# Patient Record
Sex: FEMALE | Race: WHITE | NOT HISPANIC OR LATINO
[De-identification: names, ages, dates, MRNs, and addresses within clinical notes are randomized per-mention and may not be internally consistent; named-entity substitution may affect disease eponyms.]

---

## 2017-03-02 ENCOUNTER — NON-APPOINTMENT (OUTPATIENT)
Age: 82
End: 2017-03-02

## 2017-03-02 ENCOUNTER — APPOINTMENT (OUTPATIENT)
Dept: CARDIOLOGY | Facility: CLINIC | Age: 82
End: 2017-03-02

## 2017-03-02 VITALS
DIASTOLIC BLOOD PRESSURE: 70 MMHG | SYSTOLIC BLOOD PRESSURE: 130 MMHG | BODY MASS INDEX: 22.78 KG/M2 | OXYGEN SATURATION: 98 % | HEIGHT: 60 IN | WEIGHT: 116 LBS | HEART RATE: 58 BPM

## 2017-03-09 ENCOUNTER — APPOINTMENT (OUTPATIENT)
Dept: ELECTROPHYSIOLOGY | Facility: CLINIC | Age: 82
End: 2017-03-09

## 2017-06-07 ENCOUNTER — APPOINTMENT (OUTPATIENT)
Dept: CARDIOLOGY | Facility: CLINIC | Age: 82
End: 2017-06-07

## 2017-06-07 ENCOUNTER — APPOINTMENT (OUTPATIENT)
Dept: ELECTROPHYSIOLOGY | Facility: CLINIC | Age: 82
End: 2017-06-07

## 2017-08-01 ENCOUNTER — RX RENEWAL (OUTPATIENT)
Age: 82
End: 2017-08-01

## 2017-08-29 ENCOUNTER — RX RENEWAL (OUTPATIENT)
Age: 82
End: 2017-08-29

## 2017-09-06 ENCOUNTER — NON-APPOINTMENT (OUTPATIENT)
Age: 82
End: 2017-09-06

## 2017-09-06 ENCOUNTER — APPOINTMENT (OUTPATIENT)
Dept: CARDIOLOGY | Facility: CLINIC | Age: 82
End: 2017-09-06
Payer: MEDICARE

## 2017-09-06 VITALS
DIASTOLIC BLOOD PRESSURE: 72 MMHG | HEIGHT: 62 IN | SYSTOLIC BLOOD PRESSURE: 128 MMHG | HEART RATE: 56 BPM | WEIGHT: 111 LBS | BODY MASS INDEX: 20.43 KG/M2 | OXYGEN SATURATION: 97 %

## 2017-09-06 DIAGNOSIS — I34.0 NONRHEUMATIC MITRAL (VALVE) INSUFFICIENCY: ICD-10-CM

## 2017-09-06 PROCEDURE — 99215 OFFICE O/P EST HI 40 MIN: CPT

## 2017-09-06 PROCEDURE — 93000 ELECTROCARDIOGRAM COMPLETE: CPT

## 2017-09-13 LAB
25(OH)D3 SERPL-MCNC: 53.9 NG/ML
ALBUMIN SERPL ELPH-MCNC: 3.4 G/DL
ALP BLD-CCNC: 101 U/L
ALT SERPL-CCNC: 9 U/L
ANION GAP SERPL CALC-SCNC: 17 MMOL/L
AST SERPL-CCNC: 14 U/L
BASOPHILS # BLD AUTO: 0.05 K/UL
BASOPHILS NFR BLD AUTO: 0.5 %
BILIRUB SERPL-MCNC: 0.3 MG/DL
BUN SERPL-MCNC: 19 MG/DL
CALCIUM SERPL-MCNC: 9.1 MG/DL
CHLORIDE SERPL-SCNC: 100 MMOL/L
CHOLEST SERPL-MCNC: 165 MG/DL
CHOLEST/HDLC SERPL: 2.7 RATIO
CO2 SERPL-SCNC: 26 MMOL/L
CREAT SERPL-MCNC: 0.95 MG/DL
CRP SERPL HS-MCNC: 14.5 MG/L
DIGOXIN SERPL-MCNC: 1.4 NG/ML
EOSINOPHIL # BLD AUTO: 0.1 K/UL
EOSINOPHIL NFR BLD AUTO: 1 %
GLUCOSE SERPL-MCNC: 185 MG/DL
HBA1C MFR BLD HPLC: 5.3 %
HCT VFR BLD CALC: 40 %
HDLC SERPL-MCNC: 62 MG/DL
HGB BLD-MCNC: 12.8 G/DL
IMM GRANULOCYTES NFR BLD AUTO: 0.2 %
LDLC SERPL CALC-MCNC: 82 MG/DL
LYMPHOCYTES # BLD AUTO: 1.7 K/UL
LYMPHOCYTES NFR BLD AUTO: 17.5 %
MAGNESIUM SERPL-MCNC: 1.8 MG/DL
MAN DIFF?: NORMAL
MCHC RBC-ENTMCNC: 30.1 PG
MCHC RBC-ENTMCNC: 32 GM/DL
MCV RBC AUTO: 94.1 FL
MONOCYTES # BLD AUTO: 0.73 K/UL
MONOCYTES NFR BLD AUTO: 7.5 %
NEUTROPHILS # BLD AUTO: 7.14 K/UL
NEUTROPHILS NFR BLD AUTO: 73.3 %
PLATELET # BLD AUTO: 247 K/UL
POTASSIUM SERPL-SCNC: 3.5 MMOL/L
PROT SERPL-MCNC: 6.5 G/DL
RBC # BLD: 4.25 M/UL
RBC # FLD: 14.1 %
SODIUM SERPL-SCNC: 143 MMOL/L
T3FREE SERPL-MCNC: 1.91 PG/ML
T4 FREE SERPL-MCNC: 1.2 NG/DL
TRIGL SERPL-MCNC: 107 MG/DL
TSH SERPL-ACNC: 3.19 UIU/ML
URATE SERPL-MCNC: 5.4 MG/DL
WBC # FLD AUTO: 9.74 K/UL

## 2017-12-07 ENCOUNTER — NON-APPOINTMENT (OUTPATIENT)
Age: 82
End: 2017-12-07

## 2017-12-07 ENCOUNTER — APPOINTMENT (OUTPATIENT)
Dept: CARDIOLOGY | Facility: CLINIC | Age: 82
End: 2017-12-07
Payer: MEDICARE

## 2017-12-07 VITALS
WEIGHT: 109 LBS | HEART RATE: 72 BPM | SYSTOLIC BLOOD PRESSURE: 100 MMHG | OXYGEN SATURATION: 98 % | HEIGHT: 62 IN | BODY MASS INDEX: 20.06 KG/M2 | DIASTOLIC BLOOD PRESSURE: 70 MMHG

## 2017-12-07 DIAGNOSIS — R00.1 BRADYCARDIA, UNSPECIFIED: ICD-10-CM

## 2017-12-07 PROCEDURE — 93280 PM DEVICE PROGR EVAL DUAL: CPT

## 2017-12-07 PROCEDURE — 93000 ELECTROCARDIOGRAM COMPLETE: CPT

## 2017-12-07 PROCEDURE — 99215 OFFICE O/P EST HI 40 MIN: CPT

## 2018-04-19 ENCOUNTER — RX RENEWAL (OUTPATIENT)
Age: 83
End: 2018-04-19

## 2018-05-18 ENCOUNTER — RX RENEWAL (OUTPATIENT)
Age: 83
End: 2018-05-18

## 2018-06-07 ENCOUNTER — NON-APPOINTMENT (OUTPATIENT)
Age: 83
End: 2018-06-07

## 2018-06-07 ENCOUNTER — APPOINTMENT (OUTPATIENT)
Dept: CARDIOLOGY | Facility: CLINIC | Age: 83
End: 2018-06-07
Payer: MEDICARE

## 2018-06-07 VITALS
SYSTOLIC BLOOD PRESSURE: 118 MMHG | OXYGEN SATURATION: 99 % | BODY MASS INDEX: 20.61 KG/M2 | DIASTOLIC BLOOD PRESSURE: 70 MMHG | HEIGHT: 62 IN | WEIGHT: 112 LBS | HEART RATE: 60 BPM

## 2018-06-07 PROCEDURE — 99215 OFFICE O/P EST HI 40 MIN: CPT

## 2018-06-07 PROCEDURE — 93000 ELECTROCARDIOGRAM COMPLETE: CPT

## 2018-06-08 LAB
25(OH)D3 SERPL-MCNC: 55.8 NG/ML
ALBUMIN SERPL ELPH-MCNC: 3.5 G/DL
ALP BLD-CCNC: 83 U/L
ALT SERPL-CCNC: 9 U/L
ANION GAP SERPL CALC-SCNC: 17 MMOL/L
AST SERPL-CCNC: 16 U/L
BASOPHILS # BLD AUTO: 0.05 K/UL
BASOPHILS NFR BLD AUTO: 0.5 %
BILIRUB SERPL-MCNC: 0.4 MG/DL
BUN SERPL-MCNC: 19 MG/DL
CALCIUM SERPL-MCNC: 9.2 MG/DL
CHLORIDE SERPL-SCNC: 102 MMOL/L
CHOLEST SERPL-MCNC: 179 MG/DL
CHOLEST/HDLC SERPL: 2.6 RATIO
CO2 SERPL-SCNC: 24 MMOL/L
CREAT SERPL-MCNC: 0.86 MG/DL
CRP SERPL HS-MCNC: 7.4 MG/L
DIGOXIN SERPL-MCNC: 1 NG/ML
EOSINOPHIL # BLD AUTO: 0.1 K/UL
EOSINOPHIL NFR BLD AUTO: 0.9 %
GLUCOSE SERPL-MCNC: 99 MG/DL
HBA1C MFR BLD HPLC: 5.1 %
HCT VFR BLD CALC: 40.9 %
HDLC SERPL-MCNC: 69 MG/DL
HGB BLD-MCNC: 12.8 G/DL
IMM GRANULOCYTES NFR BLD AUTO: 0.3 %
LDLC SERPL CALC-MCNC: 90 MG/DL
LYMPHOCYTES # BLD AUTO: 1.53 K/UL
LYMPHOCYTES NFR BLD AUTO: 14.1 %
MAGNESIUM SERPL-MCNC: 1.8 MG/DL
MAN DIFF?: NORMAL
MCHC RBC-ENTMCNC: 29.1 PG
MCHC RBC-ENTMCNC: 31.3 GM/DL
MCV RBC AUTO: 93 FL
MONOCYTES # BLD AUTO: 0.91 K/UL
MONOCYTES NFR BLD AUTO: 8.4 %
NEUTROPHILS # BLD AUTO: 8.23 K/UL
NEUTROPHILS NFR BLD AUTO: 75.8 %
PLATELET # BLD AUTO: 252 K/UL
POTASSIUM SERPL-SCNC: 3.9 MMOL/L
PROT SERPL-MCNC: 6.4 G/DL
RBC # BLD: 4.4 M/UL
RBC # FLD: 14.1 %
SODIUM SERPL-SCNC: 143 MMOL/L
T3FREE SERPL-MCNC: 1.99 PG/ML
T4 FREE SERPL-MCNC: 1.2 NG/DL
TRIGL SERPL-MCNC: 99 MG/DL
TSH SERPL-ACNC: 3.11 UIU/ML
WBC # FLD AUTO: 10.85 K/UL

## 2018-12-13 ENCOUNTER — LABORATORY RESULT (OUTPATIENT)
Age: 83
End: 2018-12-13

## 2018-12-13 ENCOUNTER — APPOINTMENT (OUTPATIENT)
Dept: CARDIOLOGY | Facility: CLINIC | Age: 83
End: 2018-12-13
Payer: MEDICARE

## 2018-12-13 ENCOUNTER — NON-APPOINTMENT (OUTPATIENT)
Age: 83
End: 2018-12-13

## 2018-12-13 VITALS
HEIGHT: 62 IN | DIASTOLIC BLOOD PRESSURE: 72 MMHG | WEIGHT: 105 LBS | HEART RATE: 64 BPM | SYSTOLIC BLOOD PRESSURE: 110 MMHG | OXYGEN SATURATION: 98 % | BODY MASS INDEX: 19.32 KG/M2

## 2018-12-13 DIAGNOSIS — E79.0 HYPERURICEMIA W/OUT SIGNS OF INFLAMMATORY ARTHRITIS AND TOPHACEOUS DISEASE: ICD-10-CM

## 2018-12-13 DIAGNOSIS — E78.00 PURE HYPERCHOLESTEROLEMIA, UNSPECIFIED: ICD-10-CM

## 2018-12-13 DIAGNOSIS — Z23 ENCOUNTER FOR IMMUNIZATION: ICD-10-CM

## 2018-12-13 PROCEDURE — 90662 IIV NO PRSV INCREASED AG IM: CPT

## 2018-12-13 PROCEDURE — 93280 PM DEVICE PROGR EVAL DUAL: CPT

## 2018-12-13 PROCEDURE — 99215 OFFICE O/P EST HI 40 MIN: CPT | Mod: 25

## 2018-12-13 PROCEDURE — G0008: CPT

## 2018-12-13 PROCEDURE — 93000 ELECTROCARDIOGRAM COMPLETE: CPT | Mod: 59

## 2018-12-13 NOTE — DISCUSSION/SUMMARY
[Moderate Aortic Stenosis] : moderate aortic stenosis [Atrial Fibrillation] : atrial fibrillation [Hypertension] : hypertension [Moderate Mitral Regurgitation] : moderate mitral regurgitation [Stable] : stable [Echocardiogram] : echocardiogram [None] : none [FreeTextEntry1] : Stable from cardiovascular standpoint. Pacemaker followup as per schedule. Continue current medical regimen.Labs drawn. Rxgiven.

## 2018-12-13 NOTE — PHYSICAL EXAM
[General Appearance - Well Developed] : well developed [Normal Appearance] : normal appearance [Well Groomed] : well groomed [General Appearance - Well Nourished] : well nourished [No Deformities] : no deformities [General Appearance - In No Acute Distress] : no acute distress [Normal Conjunctiva] : the conjunctiva exhibited no abnormalities [Eyelids - No Xanthelasma] : the eyelids demonstrated no xanthelasmas [Normal Oral Mucosa] : normal oral mucosa [No Oral Pallor] : no oral pallor [No Oral Cyanosis] : no oral cyanosis [Normal Jugular Venous A Waves Present] : normal jugular venous A waves present [Normal Jugular Venous V Waves Present] : normal jugular venous V waves present [No Jugular Venous Whitley A Waves] : no jugular venous whitley A waves [Respiration, Rhythm And Depth] : normal respiratory rhythm and effort [Exaggerated Use Of Accessory Muscles For Inspiration] : no accessory muscle use [Auscultation Breath Sounds / Voice Sounds] : lungs were clear to auscultation bilaterally [Abdomen Soft] : soft [Abdomen Tenderness] : non-tender [Abdomen Mass (___ Cm)] : no abdominal mass palpated [Abnormal Walk] : normal gait [Gait - Sufficient For Exercise Testing] : the gait was sufficient for exercise testing [Nail Clubbing] : no clubbing of the fingernails [Cyanosis, Localized] : no localized cyanosis [Petechial Hemorrhages (___cm)] : no petechial hemorrhages [Skin Color & Pigmentation] : normal skin color and pigmentation [] : no rash [No Venous Stasis] : no venous stasis [Skin Lesions] : no skin lesions [No Skin Ulcers] : no skin ulcer [No Xanthoma] : no  xanthoma was observed [FreeTextEntry1] : Flattened affect, not oriented to place or time. Does not identify this physician.

## 2018-12-13 NOTE — REASON FOR VISIT
[Follow-up Device Check] : follow-up device check visit [PAKO (Elective Replacement Indication)] : elective replacement indication [Family Member] : family member

## 2018-12-13 NOTE — HISTORY OF PRESENT ILLNESS
[FreeTextEntry1] : No new complaints noted. Patient becoming progressively more forgetful and less animated. No other significant findings on history.No recent labs.No chest pain, diaphoresis, palpitations. Patient sleeps in a recliner regularly. Weight stabilized

## 2018-12-13 NOTE — REASON FOR VISIT
[Follow-Up - Clinic] : a clinic follow-up of [Atrial Fibrillation] : atrial fibrillation [Medication Management] : Medication management

## 2018-12-18 ENCOUNTER — MEDICATION RENEWAL (OUTPATIENT)
Age: 83
End: 2018-12-18

## 2018-12-19 LAB
ALBUMIN SERPL ELPH-MCNC: 3.6 G/DL
ALP BLD-CCNC: 96 U/L
ALT SERPL-CCNC: 12 U/L
ANION GAP SERPL CALC-SCNC: 14 MMOL/L
AST SERPL-CCNC: 16 U/L
BASOPHILS # BLD AUTO: 0.03 K/UL
BASOPHILS NFR BLD AUTO: 0.3 %
BILIRUB SERPL-MCNC: 0.2 MG/DL
BUN SERPL-MCNC: 28 MG/DL
CALCIUM SERPL-MCNC: 8.9 MG/DL
CHLORIDE SERPL-SCNC: 108 MMOL/L
CHOLEST SERPL-MCNC: 168 MG/DL
CHOLEST/HDLC SERPL: 2.6 RATIO
CO2 SERPL-SCNC: 20 MMOL/L
CREAT SERPL-MCNC: 0.87 MG/DL
EOSINOPHIL # BLD AUTO: 0.07 K/UL
EOSINOPHIL NFR BLD AUTO: 0.7 %
GLUCOSE SERPL-MCNC: 110 MG/DL
HBA1C MFR BLD HPLC: 5.1 %
HCT VFR BLD CALC: 42.8 %
HDLC SERPL-MCNC: 64 MG/DL
HGB BLD-MCNC: 14 G/DL
IMM GRANULOCYTES NFR BLD AUTO: 0.3 %
LDLC SERPL CALC-MCNC: 88 MG/DL
LYMPHOCYTES # BLD AUTO: 1.53 K/UL
LYMPHOCYTES NFR BLD AUTO: 14.9 %
MAGNESIUM SERPL-MCNC: 1.9 MG/DL
MAN DIFF?: NORMAL
MCHC RBC-ENTMCNC: 29.7 PG
MCHC RBC-ENTMCNC: 32.7 GM/DL
MCV RBC AUTO: 90.9 FL
MONOCYTES # BLD AUTO: 0.91 K/UL
MONOCYTES NFR BLD AUTO: 8.9 %
NEUTROPHILS # BLD AUTO: 7.67 K/UL
NEUTROPHILS NFR BLD AUTO: 74.9 %
PLATELET # BLD AUTO: 216 K/UL
POTASSIUM SERPL-SCNC: 4 MMOL/L
PROT SERPL-MCNC: 6.6 G/DL
RBC # BLD: 4.71 M/UL
RBC # FLD: 14.2 %
SODIUM SERPL-SCNC: 142 MMOL/L
TRIGL SERPL-MCNC: 81 MG/DL
TSH SERPL-ACNC: 3.54 UIU/ML
WBC # FLD AUTO: 10.24 K/UL

## 2018-12-28 NOTE — PROCEDURE
[Atrial Fibrillation] : atrial fibrillation [Pacemaker] : pacemaker [DDD] : DDD [Voltage: ___ volts] : Voltage was [unfilled] volts [Magnet Rate: ___ Ppm] : magnet rate was [unfilled] Ppm [Longevity: ___ months] : The estimated remaining battery life is [unfilled] months [Threshold Testing Performed] : Threshold testing was performed [Lead Imp:  ___ohms] : lead impedance was [unfilled] ohms [Sensing Amplitude ___mv] : sensing amplitude was [unfilled] mv [___V @] : [unfilled] V [___ ms] : [unfilled] ms [None] : none [Counters Reset] : the counters were reset [Apace-Vpace ___ %] : Apace-Vpace [unfilled]% [de-identified] : [facility] [de-identified] : L314 [de-identified] : [id number] [de-identified] : 3/25/16 [de-identified] : 60 [de-identified] : device upgrade 1.10 installed.

## 2018-12-28 NOTE — DISCUSSION/SUMMARY
[Pacemaker Function Normal] : normal pacemaker function [Patient] : the patient [Family] : the patient's family [de-identified] : f/u in 6 months.

## 2019-01-10 ENCOUNTER — RX RENEWAL (OUTPATIENT)
Age: 84
End: 2019-01-10

## 2019-01-21 ENCOUNTER — MEDICATION RENEWAL (OUTPATIENT)
Age: 84
End: 2019-01-21

## 2019-01-29 ENCOUNTER — MEDICATION RENEWAL (OUTPATIENT)
Age: 84
End: 2019-01-29

## 2019-02-11 ENCOUNTER — RX RENEWAL (OUTPATIENT)
Age: 84
End: 2019-02-11

## 2019-07-11 ENCOUNTER — APPOINTMENT (OUTPATIENT)
Dept: CARDIOLOGY | Facility: CLINIC | Age: 84
End: 2019-07-11
Payer: MEDICARE

## 2019-07-11 ENCOUNTER — NON-APPOINTMENT (OUTPATIENT)
Age: 84
End: 2019-07-11

## 2019-07-11 VITALS
HEIGHT: 62 IN | HEART RATE: 60 BPM | OXYGEN SATURATION: 98 % | BODY MASS INDEX: 20.06 KG/M2 | WEIGHT: 109 LBS | DIASTOLIC BLOOD PRESSURE: 70 MMHG | SYSTOLIC BLOOD PRESSURE: 140 MMHG

## 2019-07-11 PROCEDURE — 99214 OFFICE O/P EST MOD 30 MIN: CPT

## 2019-07-11 PROCEDURE — 93000 ELECTROCARDIOGRAM COMPLETE: CPT | Mod: 59

## 2019-07-11 PROCEDURE — 93280 PM DEVICE PROGR EVAL DUAL: CPT

## 2019-07-12 LAB
ANION GAP SERPL CALC-SCNC: 16 MMOL/L
BASOPHILS # BLD AUTO: 0.08 K/UL
BASOPHILS NFR BLD AUTO: 0.8 %
BUN SERPL-MCNC: 23 MG/DL
CALCIUM SERPL-MCNC: 9 MG/DL
CHLORIDE SERPL-SCNC: 106 MMOL/L
CO2 SERPL-SCNC: 20 MMOL/L
CREAT SERPL-MCNC: 0.81 MG/DL
EOSINOPHIL # BLD AUTO: 0.15 K/UL
EOSINOPHIL NFR BLD AUTO: 1.6 %
GLUCOSE SERPL-MCNC: 102 MG/DL
HCT VFR BLD CALC: 46.2 %
HGB BLD-MCNC: 14 G/DL
IMM GRANULOCYTES NFR BLD AUTO: 0.5 %
LYMPHOCYTES # BLD AUTO: 2.23 K/UL
LYMPHOCYTES NFR BLD AUTO: 23.1 %
MAGNESIUM SERPL-MCNC: 1.8 MG/DL
MAN DIFF?: NORMAL
MCHC RBC-ENTMCNC: 29.2 PG
MCHC RBC-ENTMCNC: 30.3 GM/DL
MCV RBC AUTO: 96.3 FL
MONOCYTES # BLD AUTO: 0.78 K/UL
MONOCYTES NFR BLD AUTO: 8.1 %
NEUTROPHILS # BLD AUTO: 6.37 K/UL
NEUTROPHILS NFR BLD AUTO: 65.9 %
PLATELET # BLD AUTO: 243 K/UL
POTASSIUM SERPL-SCNC: 3.7 MMOL/L
RBC # BLD: 4.8 M/UL
RBC # FLD: 13.6 %
SODIUM SERPL-SCNC: 142 MMOL/L
TSH SERPL-ACNC: 3.28 UIU/ML
URATE SERPL-MCNC: 4.3 MG/DL
WBC # FLD AUTO: 9.66 K/UL

## 2019-07-12 NOTE — REASON FOR VISIT
[PAKO (Elective Replacement Indication)] : elective replacement indication [Follow-up Device Check] : follow-up device check visit [Family Member] : family member

## 2019-07-12 NOTE — PROCEDURE
[Atrial Fibrillation] : atrial fibrillation [Pacemaker] : pacemaker [DDD] : DDD [Voltage: ___ volts] : Voltage was [unfilled] volts [Magnet Rate: ___ Ppm] : magnet rate was [unfilled] Ppm [Longevity: ___ months] : The estimated remaining battery life is [unfilled] months [Threshold Testing Performed] : Threshold testing was performed [Lead Imp:  ___ohms] : lead impedance was [unfilled] ohms [___V @] : [unfilled] V [Sensing Amplitude ___mv] : sensing amplitude was [unfilled] mv [___ ms] : [unfilled] ms [None] : none [Counters Reset] : the counters were reset [Apace-Vpace ___ %] : Apace-Vpace [unfilled]% [de-identified] : [facility] [de-identified] : L395 [de-identified] : [id number] [de-identified] : 60 [de-identified] : 3/25/16 [de-identified] : 1 episode non sustained VT.

## 2019-07-12 NOTE — DISCUSSION/SUMMARY
[Patient] : the patient [Pacemaker Function Normal] : normal pacemaker function [de-identified] : f/u in 6 months. [Family] : the patient's family

## 2019-07-15 NOTE — DISCUSSION/SUMMARY
[Moderate Aortic Stenosis] : moderate aortic stenosis [Atrial Fibrillation] : atrial fibrillation [Hypertension] : hypertension [Moderate Mitral Regurgitation] : moderate mitral regurgitation [Stable] : stable [Echocardiogram] : echocardiogram [None] : none [FreeTextEntry1] : Stable from cardiovascular standpoint. Pacemaker followup as per schedule. Continue current medical regimen.Labs drawn. MOLST already completed..

## 2019-08-12 ENCOUNTER — MEDICATION RENEWAL (OUTPATIENT)
Age: 84
End: 2019-08-12

## 2019-09-25 ENCOUNTER — MEDICATION RENEWAL (OUTPATIENT)
Age: 84
End: 2019-09-25

## 2019-09-25 ENCOUNTER — RX RENEWAL (OUTPATIENT)
Age: 84
End: 2019-09-25

## 2019-11-06 ENCOUNTER — RX RENEWAL (OUTPATIENT)
Age: 84
End: 2019-11-06

## 2019-12-30 ENCOUNTER — RX RENEWAL (OUTPATIENT)
Age: 84
End: 2019-12-30

## 2020-01-16 ENCOUNTER — APPOINTMENT (OUTPATIENT)
Dept: CARDIOLOGY | Facility: CLINIC | Age: 85
End: 2020-01-16
Payer: MEDICARE

## 2020-01-16 ENCOUNTER — NON-APPOINTMENT (OUTPATIENT)
Age: 85
End: 2020-01-16

## 2020-01-16 VITALS
HEIGHT: 62 IN | BODY MASS INDEX: 20.06 KG/M2 | HEART RATE: 60 BPM | OXYGEN SATURATION: 98 % | SYSTOLIC BLOOD PRESSURE: 110 MMHG | WEIGHT: 109 LBS | DIASTOLIC BLOOD PRESSURE: 60 MMHG

## 2020-01-16 DIAGNOSIS — Z45.018 ENCOUNTER FOR ADJUSTMENT AND MANAGEMENT OF OTHER PART OF CARDIAC PACEMAKER: ICD-10-CM

## 2020-01-16 DIAGNOSIS — I35.0 NONRHEUMATIC AORTIC (VALVE) STENOSIS: ICD-10-CM

## 2020-01-16 DIAGNOSIS — R62.7 ADULT FAILURE TO THRIVE: ICD-10-CM

## 2020-01-16 PROCEDURE — 93280 PM DEVICE PROGR EVAL DUAL: CPT

## 2020-01-16 PROCEDURE — 93000 ELECTROCARDIOGRAM COMPLETE: CPT | Mod: 59

## 2020-01-16 PROCEDURE — 99214 OFFICE O/P EST MOD 30 MIN: CPT

## 2020-01-16 PROCEDURE — 93000 ELECTROCARDIOGRAM COMPLETE: CPT

## 2020-01-16 NOTE — DISCUSSION/SUMMARY
[Moderate Aortic Stenosis] : moderate aortic stenosis [Atrial Fibrillation] : atrial fibrillation [Hypertension] : hypertension [Pacemaker Function Normal] : normal pacemaker function [Moderate Mitral Regurgitation] : moderate mitral regurgitation [Stable] : stable [Echocardiogram] : echocardiogram [None] : none [Guardian] : the guardian [___ Month(s)] : [unfilled] month(s) [FreeTextEntry1] : Stable from cardiovascular standpoint. Pacemaker followup as per schedule. Continue current medical regimen.Labs drawn. MOLST already completed..

## 2020-01-16 NOTE — PHYSICAL EXAM
[General Appearance - Well Developed] : well developed [Normal Appearance] : normal appearance [Well Groomed] : well groomed [General Appearance - Well Nourished] : well nourished [No Deformities] : no deformities [General Appearance - In No Acute Distress] : no acute distress [Normal Conjunctiva] : the conjunctiva exhibited no abnormalities [Eyelids - No Xanthelasma] : the eyelids demonstrated no xanthelasmas [Normal Oral Mucosa] : normal oral mucosa [No Oral Pallor] : no oral pallor [No Oral Cyanosis] : no oral cyanosis [Normal Jugular Venous A Waves Present] : normal jugular venous A waves present [Normal Jugular Venous V Waves Present] : normal jugular venous V waves present [No Jugular Venous Whitley A Waves] : no jugular venous whitley A waves [Respiration, Rhythm And Depth] : normal respiratory rhythm and effort [Exaggerated Use Of Accessory Muscles For Inspiration] : no accessory muscle use [Auscultation Breath Sounds / Voice Sounds] : lungs were clear to auscultation bilaterally [Abdomen Soft] : soft [Abdomen Tenderness] : non-tender [Abnormal Walk] : normal gait [Abdomen Mass (___ Cm)] : no abdominal mass palpated [Gait - Sufficient For Exercise Testing] : the gait was sufficient for exercise testing [Nail Clubbing] : no clubbing of the fingernails [Cyanosis, Localized] : no localized cyanosis [Petechial Hemorrhages (___cm)] : no petechial hemorrhages [Skin Color & Pigmentation] : normal skin color and pigmentation [] : no rash [No Venous Stasis] : no venous stasis [Skin Lesions] : no skin lesions [No Skin Ulcers] : no skin ulcer [No Xanthoma] : no  xanthoma was observed [FreeTextEntry1] : Flattened affect, not oriented to place or time. Does not identify this physician.

## 2020-01-16 NOTE — HISTORY OF PRESENT ILLNESS
[FreeTextEntry1] : No new complaints noted. Patient becoming progressively more obtunded. No other significant findings on history.No recent labs.No chest pain, diaphoresis, palpitations. Patient sleeps in a recliner regularly. Weight stabilized

## 2020-01-19 NOTE — PROCEDURE
[Atrial Fibrillation] : atrial fibrillation [Pacemaker] : pacemaker [DDD] : DDD [Voltage: ___ volts] : Voltage was [unfilled] volts [Magnet Rate: ___ Ppm] : magnet rate was [unfilled] Ppm [Longevity: ___ months] : The estimated remaining battery life is [unfilled] months [Threshold Testing Performed] : Threshold testing was performed [Lead Imp:  ___ohms] : lead impedance was [unfilled] ohms [Sensing Amplitude ___mv] : sensing amplitude was [unfilled] mv [___V @] : [unfilled] V [___ ms] : [unfilled] ms [None] : none [Counters Reset] : the counters were reset [Apace-Vpace ___ %] : Apace-Vpace [unfilled]% [de-identified] : [facility] [de-identified] : L388 [de-identified] : [id number] [de-identified] : 3/25/16 [de-identified] : 60

## 2020-01-19 NOTE — DISCUSSION/SUMMARY
[Pacemaker Function Normal] : normal pacemaker function [Patient] : the patient [Family] : the patient's family [de-identified] : f/u in 6 months.

## 2020-01-21 LAB
ALBUMIN SERPL ELPH-MCNC: 3.1 G/DL
ALP BLD-CCNC: 90 U/L
ALT SERPL-CCNC: 8 U/L
ANION GAP SERPL CALC-SCNC: 19 MMOL/L
AST SERPL-CCNC: 18 U/L
BASOPHILS # BLD AUTO: 0.06 K/UL
BASOPHILS NFR BLD AUTO: 0.6 %
BILIRUB SERPL-MCNC: 0.3 MG/DL
BUN SERPL-MCNC: 23 MG/DL
CALCIUM SERPL-MCNC: 9 MG/DL
CHLORIDE SERPL-SCNC: 108 MMOL/L
CO2 SERPL-SCNC: 20 MMOL/L
CREAT SERPL-MCNC: 0.6 MG/DL
EOSINOPHIL # BLD AUTO: 0.16 K/UL
EOSINOPHIL NFR BLD AUTO: 1.6 %
GLUCOSE SERPL-MCNC: 126 MG/DL
HCT VFR BLD CALC: 45.2 %
HGB BLD-MCNC: 14.3 G/DL
IMM GRANULOCYTES NFR BLD AUTO: 0.4 %
LYMPHOCYTES # BLD AUTO: 1.61 K/UL
LYMPHOCYTES NFR BLD AUTO: 16.1 %
MAN DIFF?: NORMAL
MCHC RBC-ENTMCNC: 30.2 PG
MCHC RBC-ENTMCNC: 31.6 GM/DL
MCV RBC AUTO: 95.4 FL
MONOCYTES # BLD AUTO: 0.68 K/UL
MONOCYTES NFR BLD AUTO: 6.8 %
NEUTROPHILS # BLD AUTO: 7.43 K/UL
NEUTROPHILS NFR BLD AUTO: 74.5 %
PLATELET # BLD AUTO: 216 K/UL
POTASSIUM SERPL-SCNC: 4.1 MMOL/L
PROT SERPL-MCNC: 6 G/DL
RBC # BLD: 4.74 M/UL
RBC # FLD: 14.7 %
SODIUM SERPL-SCNC: 146 MMOL/L
TSH SERPL-ACNC: 3.41 UIU/ML
URATE SERPL-MCNC: 4.4 MG/DL
WBC # FLD AUTO: 9.98 K/UL

## 2020-01-30 ENCOUNTER — RX RENEWAL (OUTPATIENT)
Age: 85
End: 2020-01-30

## 2020-02-01 ENCOUNTER — INPATIENT (INPATIENT)
Facility: HOSPITAL | Age: 85
LOS: 3 days | Discharge: HOME | End: 2020-02-05
Attending: INTERNAL MEDICINE | Admitting: INTERNAL MEDICINE
Payer: MEDICARE

## 2020-02-01 VITALS
RESPIRATION RATE: 20 BRPM | SYSTOLIC BLOOD PRESSURE: 178 MMHG | TEMPERATURE: 98 F | OXYGEN SATURATION: 98 % | WEIGHT: 110.01 LBS | DIASTOLIC BLOOD PRESSURE: 80 MMHG | HEART RATE: 70 BPM

## 2020-02-01 DIAGNOSIS — J18.9 PNEUMONIA, UNSPECIFIED ORGANISM: ICD-10-CM

## 2020-02-01 DIAGNOSIS — I50.41 ACUTE COMBINED SYSTOLIC (CONGESTIVE) AND DIASTOLIC (CONGESTIVE) HEART FAILURE: ICD-10-CM

## 2020-02-01 DIAGNOSIS — Z96.659 PRESENCE OF UNSPECIFIED ARTIFICIAL KNEE JOINT: Chronic | ICD-10-CM

## 2020-02-01 DIAGNOSIS — Z98.41 CATARACT EXTRACTION STATUS, RIGHT EYE: Chronic | ICD-10-CM

## 2020-02-01 DIAGNOSIS — I48.91 UNSPECIFIED ATRIAL FIBRILLATION: ICD-10-CM

## 2020-02-01 DIAGNOSIS — Z90.49 ACQUIRED ABSENCE OF OTHER SPECIFIED PARTS OF DIGESTIVE TRACT: Chronic | ICD-10-CM

## 2020-02-01 DIAGNOSIS — Z98.42 CATARACT EXTRACTION STATUS, LEFT EYE: Chronic | ICD-10-CM

## 2020-02-01 DIAGNOSIS — Z90.12 ACQUIRED ABSENCE OF LEFT BREAST AND NIPPLE: Chronic | ICD-10-CM

## 2020-02-01 DIAGNOSIS — Z96.651 PRESENCE OF RIGHT ARTIFICIAL KNEE JOINT: Chronic | ICD-10-CM

## 2020-02-01 DIAGNOSIS — Z90.710 ACQUIRED ABSENCE OF BOTH CERVIX AND UTERUS: Chronic | ICD-10-CM

## 2020-02-01 DIAGNOSIS — Z98.89 OTHER SPECIFIED POSTPROCEDURAL STATES: Chronic | ICD-10-CM

## 2020-02-01 DIAGNOSIS — F41.9 ANXIETY DISORDER, UNSPECIFIED: ICD-10-CM

## 2020-02-01 LAB
ALBUMIN SERPL ELPH-MCNC: 2.1 G/DL — LOW (ref 3.3–5)
ALP SERPL-CCNC: 86 U/L — SIGNIFICANT CHANGE UP (ref 40–120)
ALT FLD-CCNC: 18 U/L — SIGNIFICANT CHANGE UP (ref 12–78)
ANION GAP SERPL CALC-SCNC: 7 MMOL/L — SIGNIFICANT CHANGE UP (ref 5–17)
APPEARANCE UR: CLEAR — SIGNIFICANT CHANGE UP
AST SERPL-CCNC: 17 U/L — SIGNIFICANT CHANGE UP (ref 15–37)
BACTERIA # UR AUTO: ABNORMAL
BASOPHILS # BLD AUTO: 0.06 K/UL — SIGNIFICANT CHANGE UP (ref 0–0.2)
BASOPHILS NFR BLD AUTO: 0.4 % — SIGNIFICANT CHANGE UP (ref 0–2)
BILIRUB SERPL-MCNC: 0.3 MG/DL — SIGNIFICANT CHANGE UP (ref 0.2–1.2)
BILIRUB UR-MCNC: NEGATIVE — SIGNIFICANT CHANGE UP
BUN SERPL-MCNC: 32 MG/DL — HIGH (ref 7–23)
CALCIUM SERPL-MCNC: 8.4 MG/DL — LOW (ref 8.5–10.1)
CHLORIDE SERPL-SCNC: 108 MMOL/L — SIGNIFICANT CHANGE UP (ref 96–108)
CO2 SERPL-SCNC: 24 MMOL/L — SIGNIFICANT CHANGE UP (ref 22–31)
COLOR SPEC: YELLOW — SIGNIFICANT CHANGE UP
CREAT SERPL-MCNC: 0.75 MG/DL — SIGNIFICANT CHANGE UP (ref 0.5–1.3)
DIFF PNL FLD: ABNORMAL
DIGOXIN SERPL-MCNC: 1.59 NG/ML — SIGNIFICANT CHANGE UP (ref 0.8–2)
EOSINOPHIL # BLD AUTO: 0.21 K/UL — SIGNIFICANT CHANGE UP (ref 0–0.5)
EOSINOPHIL NFR BLD AUTO: 1.3 % — SIGNIFICANT CHANGE UP (ref 0–6)
EPI CELLS # UR: SIGNIFICANT CHANGE UP
GLUCOSE SERPL-MCNC: 111 MG/DL — HIGH (ref 70–99)
GLUCOSE UR QL: NEGATIVE MG/DL — SIGNIFICANT CHANGE UP
GRAN CASTS # UR COMP ASSIST: ABNORMAL /LPF
HCT VFR BLD CALC: 41.1 % — SIGNIFICANT CHANGE UP (ref 34.5–45)
HGB BLD-MCNC: 13.2 G/DL — SIGNIFICANT CHANGE UP (ref 11.5–15.5)
IMM GRANULOCYTES NFR BLD AUTO: 0.5 % — SIGNIFICANT CHANGE UP (ref 0–1.5)
KETONES UR-MCNC: NEGATIVE — SIGNIFICANT CHANGE UP
LACTATE SERPL-SCNC: 0.8 MMOL/L — SIGNIFICANT CHANGE UP (ref 0.7–2)
LEUKOCYTE ESTERASE UR-ACNC: NEGATIVE — SIGNIFICANT CHANGE UP
LYMPHOCYTES # BLD AUTO: 1.37 K/UL — SIGNIFICANT CHANGE UP (ref 1–3.3)
LYMPHOCYTES # BLD AUTO: 8.7 % — LOW (ref 13–44)
MCHC RBC-ENTMCNC: 29.6 PG — SIGNIFICANT CHANGE UP (ref 27–34)
MCHC RBC-ENTMCNC: 32.1 GM/DL — SIGNIFICANT CHANGE UP (ref 32–36)
MCV RBC AUTO: 92.2 FL — SIGNIFICANT CHANGE UP (ref 80–100)
MONOCYTES # BLD AUTO: 0.99 K/UL — HIGH (ref 0–0.9)
MONOCYTES NFR BLD AUTO: 6.3 % — SIGNIFICANT CHANGE UP (ref 2–14)
NEUTROPHILS # BLD AUTO: 13.08 K/UL — HIGH (ref 1.8–7.4)
NEUTROPHILS NFR BLD AUTO: 82.8 % — HIGH (ref 43–77)
NITRITE UR-MCNC: NEGATIVE — SIGNIFICANT CHANGE UP
NRBC # BLD: 0 /100 WBCS — SIGNIFICANT CHANGE UP (ref 0–0)
NT-PROBNP SERPL-SCNC: HIGH PG/ML (ref 0–450)
PH UR: 5 — SIGNIFICANT CHANGE UP (ref 5–8)
PLATELET # BLD AUTO: 329 K/UL — SIGNIFICANT CHANGE UP (ref 150–400)
POTASSIUM SERPL-MCNC: 4 MMOL/L — SIGNIFICANT CHANGE UP (ref 3.5–5.3)
POTASSIUM SERPL-SCNC: 4 MMOL/L — SIGNIFICANT CHANGE UP (ref 3.5–5.3)
PROT SERPL-MCNC: 6.8 GM/DL — SIGNIFICANT CHANGE UP (ref 6–8.3)
PROT UR-MCNC: 30 MG/DL
RBC # BLD: 4.46 M/UL — SIGNIFICANT CHANGE UP (ref 3.8–5.2)
RBC # FLD: 14.6 % — HIGH (ref 10.3–14.5)
RBC CASTS # UR COMP ASSIST: SIGNIFICANT CHANGE UP /HPF (ref 0–4)
SODIUM SERPL-SCNC: 139 MMOL/L — SIGNIFICANT CHANGE UP (ref 135–145)
SP GR SPEC: 1.02 — SIGNIFICANT CHANGE UP (ref 1.01–1.02)
UROBILINOGEN FLD QL: NEGATIVE MG/DL — SIGNIFICANT CHANGE UP
WBC # BLD: 15.79 K/UL — HIGH (ref 3.8–10.5)
WBC # FLD AUTO: 15.79 K/UL — HIGH (ref 3.8–10.5)
WBC UR QL: SIGNIFICANT CHANGE UP

## 2020-02-01 PROCEDURE — 71045 X-RAY EXAM CHEST 1 VIEW: CPT | Mod: 26

## 2020-02-01 PROCEDURE — 99285 EMERGENCY DEPT VISIT HI MDM: CPT

## 2020-02-01 RX ORDER — ALLOPURINOL 300 MG
100 TABLET ORAL DAILY
Refills: 0 | Status: DISCONTINUED | OUTPATIENT
Start: 2020-02-01 | End: 2020-02-05

## 2020-02-01 RX ORDER — DILTIAZEM HCL 120 MG
240 CAPSULE, EXT RELEASE 24 HR ORAL DAILY
Refills: 0 | Status: DISCONTINUED | OUTPATIENT
Start: 2020-02-01 | End: 2020-02-05

## 2020-02-01 RX ORDER — ATORVASTATIN CALCIUM 80 MG/1
10 TABLET, FILM COATED ORAL AT BEDTIME
Refills: 0 | Status: DISCONTINUED | OUTPATIENT
Start: 2020-02-01 | End: 2020-02-05

## 2020-02-01 RX ORDER — CEFTRIAXONE 500 MG/1
1000 INJECTION, POWDER, FOR SOLUTION INTRAMUSCULAR; INTRAVENOUS EVERY 24 HOURS
Refills: 0 | Status: DISCONTINUED | OUTPATIENT
Start: 2020-02-02 | End: 2020-02-03

## 2020-02-01 RX ORDER — FUROSEMIDE 40 MG
20 TABLET ORAL DAILY
Refills: 0 | Status: COMPLETED | OUTPATIENT
Start: 2020-02-01 | End: 2020-02-04

## 2020-02-01 RX ORDER — IPRATROPIUM/ALBUTEROL SULFATE 18-103MCG
3 AEROSOL WITH ADAPTER (GRAM) INHALATION ONCE
Refills: 0 | Status: COMPLETED | OUTPATIENT
Start: 2020-02-01 | End: 2020-02-01

## 2020-02-01 RX ORDER — MEMANTINE HYDROCHLORIDE 10 MG/1
10 TABLET ORAL
Refills: 0 | Status: DISCONTINUED | OUTPATIENT
Start: 2020-02-01 | End: 2020-02-05

## 2020-02-01 RX ORDER — CEFTRIAXONE 500 MG/1
1000 INJECTION, POWDER, FOR SOLUTION INTRAMUSCULAR; INTRAVENOUS ONCE
Refills: 0 | Status: COMPLETED | OUTPATIENT
Start: 2020-02-01 | End: 2020-02-01

## 2020-02-01 RX ORDER — HEPARIN SODIUM 5000 [USP'U]/ML
5000 INJECTION INTRAVENOUS; SUBCUTANEOUS
Refills: 0 | Status: DISCONTINUED | OUTPATIENT
Start: 2020-02-01 | End: 2020-02-05

## 2020-02-01 RX ORDER — DIGOXIN 250 MCG
0.25 TABLET ORAL DAILY
Refills: 0 | Status: DISCONTINUED | OUTPATIENT
Start: 2020-02-01 | End: 2020-02-05

## 2020-02-01 RX ORDER — ACETAMINOPHEN 500 MG
650 TABLET ORAL EVERY 6 HOURS
Refills: 0 | Status: DISCONTINUED | OUTPATIENT
Start: 2020-02-01 | End: 2020-02-05

## 2020-02-01 RX ORDER — AZITHROMYCIN 500 MG/1
500 TABLET, FILM COATED ORAL EVERY 24 HOURS
Refills: 0 | Status: DISCONTINUED | OUTPATIENT
Start: 2020-02-01 | End: 2020-02-03

## 2020-02-01 RX ORDER — IPRATROPIUM/ALBUTEROL SULFATE 18-103MCG
3 AEROSOL WITH ADAPTER (GRAM) INHALATION EVERY 6 HOURS
Refills: 0 | Status: COMPLETED | OUTPATIENT
Start: 2020-02-01 | End: 2020-02-04

## 2020-02-01 RX ORDER — AZITHROMYCIN 500 MG/1
500 TABLET, FILM COATED ORAL ONCE
Refills: 0 | Status: COMPLETED | OUTPATIENT
Start: 2020-02-01 | End: 2020-02-01

## 2020-02-01 RX ORDER — AZITHROMYCIN 500 MG/1
500 TABLET, FILM COATED ORAL ONCE
Refills: 0 | Status: DISCONTINUED | OUTPATIENT
Start: 2020-02-01 | End: 2020-02-01

## 2020-02-01 RX ADMIN — CEFTRIAXONE 100 MILLIGRAM(S): 500 INJECTION, POWDER, FOR SOLUTION INTRAMUSCULAR; INTRAVENOUS at 18:39

## 2020-02-01 RX ADMIN — ATORVASTATIN CALCIUM 10 MILLIGRAM(S): 80 TABLET, FILM COATED ORAL at 22:35

## 2020-02-01 RX ADMIN — Medication 3 MILLILITER(S): at 18:30

## 2020-02-01 RX ADMIN — Medication 20 MILLIGRAM(S): at 20:26

## 2020-02-01 RX ADMIN — AZITHROMYCIN 255 MILLIGRAM(S): 500 TABLET, FILM COATED ORAL at 20:02

## 2020-02-01 RX ADMIN — Medication 650 MILLIGRAM(S): at 21:39

## 2020-02-01 RX ADMIN — MEMANTINE HYDROCHLORIDE 10 MILLIGRAM(S): 10 TABLET ORAL at 22:35

## 2020-02-01 RX ADMIN — Medication 650 MILLIGRAM(S): at 21:09

## 2020-02-01 NOTE — H&P ADULT - NSHPPHYSICALEXAM_GEN_ALL_CORE
General: A/ox 3, No acute Distress  skin : Normal  Head. Mukul. No lacerations  Neck: Supple, NO JVD  Cardiac: S1 S2, No M/R/G  Pulmonary: CTAP, Breathing unlabored, No Rhonchi/Wheezing. bilateral basal rales.  Abdomen: Soft, Non -tender, +BS x 4 quads  Neuro: A/o x 3, No focal deficits. Normal Motor and sensory exam  Vascular: Normal distal pulses.  Extremities: . No rashes. No edema  Decubiti ; None

## 2020-02-01 NOTE — CONSULT NOTE ADULT - SUBJECTIVE AND OBJECTIVE BOX
HPI:  Pt is a 88 yo lady with a pmhx of HTN, HL, Afib on pacemaker, dementia who presents to the ED with cough. Has had 10 days of cough. Went to urgent care today and O2 sat was 89 and sent to ED. No chest pain, no fevers at home. No abdominal pain. At baseline mental status, nonverbal.   in the Ed patient has abnormal chest xray showing chf/ PNA. . O2 sat is 98 on o2. (2020 20:26)  no info from patient   seen and discussed with pts son by bedside     PAST MEDICAL & SURGICAL HISTORY:  Cardiac pacemaker  HTN (hypertension)  Alzheimer's dementia  LOLY (iron deficiency anemia)  Gout  Anxiety  Macular degeneration  Afib: was on coumadin until early , was discontinue d/t fall rsks per son ( lucila )  Head trauma: 2 subdural hematomas 2015  Cataract: left eye  Cholecystitis  Rotator cuff arthropathy: left shoulder  Uterine fibroid  Anemia  Infected hand  CA - breast cancer  Gout  High cholesterol  Hypertension  S/P cataract surgery, right:   S/P mastectomy, left:   S/P cataract surgery, left:   S/P knee replacement:   S/P cholecystectomy:   S/P rotator cuff repair:  - left  S/P hysterectomy: partial   History of cholecystectomy  History of knee replacement procedure of right knee  H/O total hysterectomy  History of total mastectomy of right breast  Gallbladder & bile duct stone      SOCHX: unable to obtain ; son says no   tobacco,  -  alcohol    FMHX: FA/MO  -non contributory       Recent Travel:    Immunizations:    Allergies    No Known Allergies    Intolerances        MEDICATIONS  (STANDING):  albuterol/ipratropium for Nebulization 3 milliLiter(s) Nebulizer every 6 hours  allopurinol 100 milliGRAM(s) Oral daily  atorvastatin 10 milliGRAM(s) Oral at bedtime  azithromycin  IVPB 500 milliGRAM(s) IV Intermittent every 24 hours  cefTRIAXone   IVPB 1000 milliGRAM(s) IV Intermittent every 24 hours  digoxin     Tablet 0.25 milliGRAM(s) Oral daily  diltiazem    milliGRAM(s) Oral daily  furosemide   Injectable 20 milliGRAM(s) IV Push daily  heparin  Injectable 5000 Unit(s) SubCutaneous two times a day  memantine 10 milliGRAM(s) Oral two times a day    MEDICATIONS  (PRN):  acetaminophen   Tablet .. 650 milliGRAM(s) Oral every 6 hours PRN Mild Pain (1 - 3)      REVIEW OF SYSTEMS:  unable to obtain     VITAL SIGNS:    T(C): 36.3 (20 @ 00:29), Max: 36.6 (20 @ 19:58)  T(F): 97.4 (20 @ 00:29), Max: 97.9 (20 @ 22:51)  HR: 78 (20 @ 00:29) (70 - 78)  BP: 140/67 (20 @ 00:29) (140/67 - 178/80)  RR: 22 (20 @ 00:29) (20 - 22)  SpO2: 99% (20 @ 00:29) (95% - 99%)    PHYSICAL EXAM:    GENERAL: NAD, well-groomed, well-developed  HEAD:  Atraumatic, Normocephalic  EYES: EOMI, PERRLA, conjunctiva and sclera clear  ENMT: dry  mucous membranes,   NECK: Supple, No JVD, Normal thyroid  NERVOUS SYSTEM: arousable in nad  CHEST/LUNG: Clear to auscultation bilaterally; No rales, rhonchi, wheezing bilaterally  HEART: Regular rate and rhythm; No murmurs, rubs, or gallops  ABDOMEN: Soft, Nontender, Nondistended; Bowel sounds present  EXTREMITIES:  2+ Peripheral Pulses, No clubbing, cyanosis, or edema  LYMPH: No lymphadenopathy noted  SKIN: No rashes or lesions  BACK: no pressor sore     LABS:                         13.2   15.79 )-----------( 329      ( 2020 17:59 )             41.1         139  |  108  |  32<H>  ----------------------------<  111<H>  4.0   |  24  |  0.75    Ca    8.4<L>      2020 17:59    TPro  6.8  /  Alb  2.1<L>  /  TBili  0.3  /  DBili  x   /  AST  17  /  ALT  18  /  AlkPhos  86  02-    LIVER FUNCTIONS - ( 2020 17:59 )  Alb: 2.1 g/dL / Pro: 6.8 gm/dL / ALK PHOS: 86 U/L / ALT: 18 U/L / AST: 17 U/L / GGT: x             Urinalysis Basic - ( 2020 18:10 )    Color: Yellow / Appearance: Clear / S.020 / pH: x  Gluc: x / Ketone: Negative  / Bili: Negative / Urobili: Negative mg/dL   Blood: x / Protein: 30 mg/dL / Nitrite: Negative   Leuk Esterase: Negative / RBC: 0-2 /HPF / WBC 3-5   Sq Epi: x / Non Sq Epi: Occasional / Bacteria: Few                                        Radiology:      < from: CT Abdomen and Pelvis w/Cont (01.15.16 @ 10:25) >  IMPRESSION: Etiology of abdominal pain is not elucidated.  Status post cholecystectomy.  Colonic diverticulosis without CT evidence of acute diverticulitis.                 JATIN MUIR M.D., ATTENDING RADIOLOGIST    < end of copied text >

## 2020-02-01 NOTE — H&P ADULT - HISTORY OF PRESENT ILLNESS
Pt is a 89 yo lady with a pmhx of HTN, HL, Afib on pacemaker, dementia who presents to the ED with cough. Has had 10 days of cough. Went to urgent care today and O2 sat was 89 and sent to ED. No chest pain, no fevers at home. No abdominal pain. At baseline mental status, nonverbal.   in the Ed patient has abnormal chest xray showing chf/ PNA. . O2 sat is 98 on o2.

## 2020-02-01 NOTE — H&P ADULT - NSICDXPASTSURGICALHX_GEN_ALL_CORE_FT
PAST SURGICAL HISTORY:  Gallbladder & bile duct stone     H/O total hysterectomy     History of cholecystectomy     History of knee replacement procedure of right knee     History of total mastectomy of right breast     S/P cataract surgery, left 2003    S/P cataract surgery, right 2006    S/P cholecystectomy 2002    S/P hysterectomy partial 1992    S/P knee replacement 2003    S/P mastectomy, left 2004    S/P rotator cuff repair 2002 - left

## 2020-02-01 NOTE — ED PROVIDER NOTE - CLINICAL SUMMARY MEDICAL DECISION MAKING FREE TEXT BOX
Ddx: viral uri/ PNA/ ro sepsis/ out of window for tamiflu.  Plan: Cbc, cmp, lactate, blood cx, reassess

## 2020-02-01 NOTE — ED ADULT NURSE NOTE - NSIMPLEMENTINTERV_GEN_ALL_ED
Implemented All Fall with Harm Risk Interventions:  [place] to call system. Call bell, personal items and telephone within reach. Instruct patient to call for assistance. Room bathroom lighting operational. Non-slip footwear when patient is off stretcher. Physically safe environment: no spills, clutter or unnecessary equipment. Stretcher in lowest position, wheels locked, appropriate side rails in place. Provide visual cue, wrist band, yellow gown, etc. Monitor gait and stability. Monitor for mental status changes and reorient to person, place, and time. Review medications for side effects contributing to fall risk. Reinforce activity limits and safety measures with patient and family. Provide visual clues: red socks.

## 2020-02-01 NOTE — H&P ADULT - NSICDXPASTMEDICALHX_GEN_ALL_CORE_FT
PAST MEDICAL HISTORY:  Afib was on coumadin until early 2015, was discontinue d/t fall rsks per son ( lucila )    Alzheimer's dementia     Anemia     Anxiety     CA - breast cancer     Cardiac pacemaker     Cataract left eye    Cholecystitis     Gout     Gout     Head trauma 2 subdural hematomas 5/2015    High cholesterol     HTN (hypertension)     Hypertension     LOLY (iron deficiency anemia)     Infected hand     Macular degeneration     Rotator cuff arthropathy left shoulder    Uterine fibroid

## 2020-02-01 NOTE — ED ADULT TRIAGE NOTE - CHIEF COMPLAINT QUOTE
pt BIBA with c/o shortness of breath, fever at home " was seen at urgent care with lox oxygen levels " @ 98% o2 saturation upon arrival

## 2020-02-01 NOTE — CONSULT NOTE ADULT - ASSESSMENT
chf/ PNA   pacemaker status   HTn  dementia  A.fib    broad spectrum antibiotics    seen and discussed with son by bedside  will follow with you thanks

## 2020-02-01 NOTE — ED ADULT NURSE NOTE - OBJECTIVE STATEMENT
Patient received alert and confused breathing mild SOB and wheezing to bilateral lungs. Med neb treatment initiated.

## 2020-02-01 NOTE — ED ADULT NURSE NOTE - PMH
Afib  was on coumadin until early 2015, was discontinue d/t fall rsks per son ( lucila )  Alzheimer's dementia    Anemia    Anxiety    CA - breast cancer    Cataract  left eye  Cholecystitis    Gout    Gout    Head trauma  2 subdural hematomas 5/2015  High cholesterol    HTN (hypertension)    Hypertension    LOLY (iron deficiency anemia)    Infected hand    Macular degeneration    Rotator cuff arthropathy  left shoulder  Uterine fibroid

## 2020-02-01 NOTE — H&P ADULT - NSHPLABSRESULTS_GEN_ALL_CORE
13.2                 139  | 24   | 32           15.79 >-----------< 329     ------------------------< 111                   41.1                 4.0  | 108  | 0.75                                         Ca 8.4   Mg x     Ph x          Ekg - paced rhythm      chest x-ra chf/PNa  Pro BNP elevated.

## 2020-02-01 NOTE — H&P ADULT - NSHPREVIEWOFSYSTEMS_GEN_ALL_CORE
REVIEW OF SYSTEMS:    CONSTITUTIONAL: No weakness, fevers or chills dementia  EYES/ENT: No visual changes;  No vertigo or throat pain   NECK: No pain or stiffness  RESPIRATORY: No cough, wheezing, hemoptysis; No shortness of breath  CARDIOVASCULAR: No chest pain or palpitations [ ] CHF [ ] MI [ ] CABG [ ] has pacemaker  GASTROINTESTINAL: No abdominal or epigastric pain. No nausea, vomiting, or hematemesis; No diarrhea or constipation. No melena or hematochezia. [ ]abdominal surgery [ ] prostrate problems   GENITOURINARY: No dysuria, frequency or hematuria   NEUROLOGICAL: No numbness or weakness. No hx of seizures  SKIN: No itching, burning, rashes, or lesions   All other review of systems is negative unless indicated above.

## 2020-02-01 NOTE — ED ADULT NURSE NOTE - PSH
Gallbladder & bile duct stone    H/O total hysterectomy    History of cholecystectomy    History of knee replacement procedure of right knee    History of total mastectomy of right breast    S/P cataract surgery, left  2003  S/P cataract surgery, right  2006  S/P cholecystectomy  2002  S/P hysterectomy  partial 1992  S/P knee replacement  2003  S/P mastectomy, left  2004  S/P rotator cuff repair  2002 - left

## 2020-02-01 NOTE — ED PROVIDER NOTE - OBJECTIVE STATEMENT
Pt is a 88 yo lady with a pmhx of HTN, HL, Afib on pacemaker, dementia who presents to the ED with cough. Has had 10 days of cough. Went to urgent care today and O2 sat was 89 and sent to ED. No chest pain, no fevers at home. No abdominal pain. At baseline mental status, nonverbal.

## 2020-02-02 DIAGNOSIS — D25.9 LEIOMYOMA OF UTERUS, UNSPECIFIED: ICD-10-CM

## 2020-02-02 DIAGNOSIS — Z95.0 PRESENCE OF CARDIAC PACEMAKER: ICD-10-CM

## 2020-02-02 LAB
ANION GAP SERPL CALC-SCNC: 7 MMOL/L — SIGNIFICANT CHANGE UP (ref 5–17)
BUN SERPL-MCNC: 28 MG/DL — HIGH (ref 7–23)
CALCIUM SERPL-MCNC: 8.4 MG/DL — LOW (ref 8.5–10.1)
CHLORIDE SERPL-SCNC: 107 MMOL/L — SIGNIFICANT CHANGE UP (ref 96–108)
CO2 SERPL-SCNC: 26 MMOL/L — SIGNIFICANT CHANGE UP (ref 22–31)
CREAT SERPL-MCNC: 0.64 MG/DL — SIGNIFICANT CHANGE UP (ref 0.5–1.3)
CRP SERPL-MCNC: 4.64 MG/DL — HIGH (ref 0–0.4)
CULTURE RESULTS: NO GROWTH — SIGNIFICANT CHANGE UP
GLUCOSE SERPL-MCNC: 88 MG/DL — SIGNIFICANT CHANGE UP (ref 70–99)
HCT VFR BLD CALC: 36.5 % — SIGNIFICANT CHANGE UP (ref 34.5–45)
HGB BLD-MCNC: 11.7 G/DL — SIGNIFICANT CHANGE UP (ref 11.5–15.5)
MCHC RBC-ENTMCNC: 29.8 PG — SIGNIFICANT CHANGE UP (ref 27–34)
MCHC RBC-ENTMCNC: 32.1 GM/DL — SIGNIFICANT CHANGE UP (ref 32–36)
MCV RBC AUTO: 92.9 FL — SIGNIFICANT CHANGE UP (ref 80–100)
NRBC # BLD: 0 /100 WBCS — SIGNIFICANT CHANGE UP (ref 0–0)
PLATELET # BLD AUTO: 288 K/UL — SIGNIFICANT CHANGE UP (ref 150–400)
POTASSIUM SERPL-MCNC: 3.8 MMOL/L — SIGNIFICANT CHANGE UP (ref 3.5–5.3)
POTASSIUM SERPL-SCNC: 3.8 MMOL/L — SIGNIFICANT CHANGE UP (ref 3.5–5.3)
PROCALCITONIN SERPL-MCNC: 0.4 NG/ML — HIGH (ref 0.02–0.1)
RBC # BLD: 3.93 M/UL — SIGNIFICANT CHANGE UP (ref 3.8–5.2)
RBC # FLD: 14.6 % — HIGH (ref 10.3–14.5)
SODIUM SERPL-SCNC: 140 MMOL/L — SIGNIFICANT CHANGE UP (ref 135–145)
SPECIMEN SOURCE: SIGNIFICANT CHANGE UP
WBC # BLD: 11.28 K/UL — HIGH (ref 3.8–10.5)
WBC # FLD AUTO: 11.28 K/UL — HIGH (ref 3.8–10.5)

## 2020-02-02 PROCEDURE — 93010 ELECTROCARDIOGRAM REPORT: CPT

## 2020-02-02 RX ADMIN — MEMANTINE HYDROCHLORIDE 10 MILLIGRAM(S): 10 TABLET ORAL at 06:42

## 2020-02-02 RX ADMIN — Medication 3 MILLILITER(S): at 11:34

## 2020-02-02 RX ADMIN — Medication 3 MILLILITER(S): at 05:04

## 2020-02-02 RX ADMIN — ATORVASTATIN CALCIUM 10 MILLIGRAM(S): 80 TABLET, FILM COATED ORAL at 22:00

## 2020-02-02 RX ADMIN — CEFTRIAXONE 100 MILLIGRAM(S): 500 INJECTION, POWDER, FOR SOLUTION INTRAMUSCULAR; INTRAVENOUS at 18:44

## 2020-02-02 RX ADMIN — HEPARIN SODIUM 5000 UNIT(S): 5000 INJECTION INTRAVENOUS; SUBCUTANEOUS at 06:42

## 2020-02-02 RX ADMIN — Medication 3 MILLILITER(S): at 17:15

## 2020-02-02 RX ADMIN — HEPARIN SODIUM 5000 UNIT(S): 5000 INJECTION INTRAVENOUS; SUBCUTANEOUS at 17:24

## 2020-02-02 RX ADMIN — Medication 0.25 MILLIGRAM(S): at 06:41

## 2020-02-02 RX ADMIN — MEMANTINE HYDROCHLORIDE 10 MILLIGRAM(S): 10 TABLET ORAL at 17:24

## 2020-02-02 RX ADMIN — Medication 100 MILLIGRAM(S): at 11:40

## 2020-02-02 RX ADMIN — Medication 240 MILLIGRAM(S): at 06:42

## 2020-02-02 RX ADMIN — Medication 20 MILLIGRAM(S): at 06:42

## 2020-02-02 RX ADMIN — Medication 3 MILLILITER(S): at 23:16

## 2020-02-02 RX ADMIN — AZITHROMYCIN 255 MILLIGRAM(S): 500 TABLET, FILM COATED ORAL at 22:00

## 2020-02-02 NOTE — PROGRESS NOTE ADULT - SUBJECTIVE AND OBJECTIVE BOX
Patient is a 89y old  Female who presents with a chief complaint of chf/ PNA, a.fib.  dementia/ elevated WBC count/Elevated Probnp. (2020 21:45)  chf/ pna.  severe dementia.   chronic A. Fib    INTERVAL HPI/OVERNIGHT EVENTS:  PAST MEDICAL & SURGICAL HISTORY:  Cardiac pacemaker  HTN (hypertension)  Alzheimer's dementia  LOLY (iron deficiency anemia)  Gout  Anxiety  Macular degeneration  Afib: was on coumadin until early , was discontinue d/t fall rsks per son ( lucila )  Head trauma: 2 subdural hematomas 2015  Cataract: left eye  Cholecystitis  Rotator cuff arthropathy: left shoulder  Uterine fibroid  Anemia  Infected hand  CA - breast cancer  Gout  High cholesterol  Hypertension  S/P cataract surgery, right:   S/P mastectomy, left:   S/P cataract surgery, left:   S/P knee replacement:   S/P cholecystectomy:   S/P rotator cuff repair:  - left  S/P hysterectomy: partial 1992  History of cholecystectomy  History of knee replacement procedure of right knee  H/O total hysterectomy  History of total mastectomy of right breast  Gallbladder & bile duct stone      MEDICATIONS  (STANDING):  albuterol/ipratropium for Nebulization 3 milliLiter(s) Nebulizer every 6 hours  allopurinol 100 milliGRAM(s) Oral daily  atorvastatin 10 milliGRAM(s) Oral at bedtime  azithromycin  IVPB 500 milliGRAM(s) IV Intermittent every 24 hours  cefTRIAXone   IVPB 1000 milliGRAM(s) IV Intermittent every 24 hours  digoxin     Tablet 0.25 milliGRAM(s) Oral daily  diltiazem    milliGRAM(s) Oral daily  furosemide   Injectable 20 milliGRAM(s) IV Push daily  heparin  Injectable 5000 Unit(s) SubCutaneous two times a day  memantine 10 milliGRAM(s) Oral two times a day    MEDICATIONS  (PRN):  acetaminophen   Tablet .. 650 milliGRAM(s) Oral every 6 hours PRN Mild Pain (1 - 3)      Allergies    No Known Allergies    Intolerances        REVIEW OF SYSTEMS:  CONSTITUTIONAL: No fever, weight loss, or fatigue  EYES: No eye pain, visual disturbances, or discharge  ENMT:  No difficulty hearing, tinnitus, vertigo; No sinus or throat pain  NECK: No pain or stiffness  BREASTS: No pain, masses, or nipple discharge  RESPIRATORY: No cough, wheezing, chills or hemoptysis; No shortness of breath  CARDIOVASCULAR: No chest pain, palpitations, dizziness, or leg swelling  GASTROINTESTINAL: No abdominal or epigastric pain. No nausea, vomiting, or hematemesis; No diarrhea or constipation. No melena or hematochezia.  GENITOURINARY: No dysuria, frequency, hematuria, or incontinence  NEUROLOGICAL: No headaches, memory loss, loss of strength, numbness, or tremors  SKIN: No itching, burning, rashes, or lesions   LYMPH NODES: No enlarged glands  ENDOCRINE: No heat or cold intolerance; No hair loss  MUSCULOSKELETAL: No joint pain or swelling; No muscle, back, or extremity pain  PSYCHIATRIC: No depression, anxiety, mood swings, or difficulty sleeping  HEME/LYMPH: No easy bruising, or bleeding gums  ALLERY AND IMMUNOLOGIC: No hives or eczema    Vital Signs Last 24 Hrs  T(C): 36.7 (2020 05:48), Max: 36.7 (2020 02:39)  T(F): 98 (2020 05:48), Max: 98 (2020 02:39)  HR: 70 (2020 05:48) (70 - 78)  BP: 125/74 (2020 05:48) (125/74 - 178/80)  BP(mean): --  RR: 18 (2020 05:48) (18 - 22)  SpO2: 100% (2020 05:48) (95% - 100%)    PHYSICAL EXAM:  GENERAL: NAD, well-groomed, well-developed  HEAD:  Atraumatic, Normocephalic  EYES: EOMI, PERRLA, conjunctiva and sclera clear  ENMT: No tonsillar erythema, exudates, or enlargement; Moist mucous membranes, Good dentition, No lesions  NECK: Supple, No JVD, Normal thyroid  NERVOUS SYSTEM:  Alert & Oriented X3, Good concentration; Motor Strength 5/5 B/L upper and lower extremities; DTRs 2+ intact and symmetric  CHEST/LUNG: Clear to percussion bilaterally; No rales, rhonchi, wheezing, or rubs  HEART: Regular rate and rhythm; No murmurs, rubs, or gallops  ABDOMEN: Soft, Nontender, Nondistended; Bowel sounds present  EXTREMITIES:  2+ Peripheral Pulses, No clubbing, cyanosis, or edema  LYMPH: No lymphadenopathy noted  SKIN: No rashes or lesions    LABS:                        11.7   11.28 )-----------( 288      ( 2020 06:40 )             36.5     02-    140  |  107  |  28<H>  ----------------------------<  88  3.8   |  26  |  0.64    Ca    8.4<L>      2020 06:40    TPro  6.8  /  Alb  2.1<L>  /  TBili  0.3  /  DBili  x   /  AST  17  /  ALT  18  /  AlkPhos  86          Urinalysis Basic - ( 2020 18:10 )    Color: Yellow / Appearance: Clear / S.020 / pH: x  Gluc: x / Ketone: Negative  / Bili: Negative / Urobili: Negative mg/dL   Blood: x / Protein: 30 mg/dL / Nitrite: Negative   Leuk Esterase: Negative / RBC: 0-2 /HPF / WBC 3-5   Sq Epi: x / Non Sq Epi: Occasional / Bacteria: Few      CAPILLARY BLOOD GLUCOSE                    RADIOLOGY & ADDITIONAL TESTS:    Imaging Personally Reviewed:  [ ] YES  [ ] NO    Consultant(s) Notes Reviewed:  [x ] YES  [ ] NO    Care Discussed with Consultants/Other Providers [x ] YES  [ ] NO    Care discussed with family,         [ x ]   yes  [  ]  No    imp:    stable[ ]    unstable[  ]     improving [   ]       unchanged  [ x ]                Plans:  Continue present plans  [ x ] continue ABX, Lasix 20 mg Iv daily. DNR status.               New consult [  ]   specialty  .......               order test[  ]    test name.  labs in am                Discharge Planning  [  ]

## 2020-02-03 LAB
ANION GAP SERPL CALC-SCNC: 8 MMOL/L — SIGNIFICANT CHANGE UP (ref 5–17)
BUN SERPL-MCNC: 28 MG/DL — HIGH (ref 7–23)
CALCIUM SERPL-MCNC: 8.4 MG/DL — LOW (ref 8.5–10.1)
CHLORIDE SERPL-SCNC: 102 MMOL/L — SIGNIFICANT CHANGE UP (ref 96–108)
CO2 SERPL-SCNC: 27 MMOL/L — SIGNIFICANT CHANGE UP (ref 22–31)
CREAT SERPL-MCNC: 0.68 MG/DL — SIGNIFICANT CHANGE UP (ref 0.5–1.3)
GLUCOSE SERPL-MCNC: 93 MG/DL — SIGNIFICANT CHANGE UP (ref 70–99)
HCT VFR BLD CALC: 36.8 % — SIGNIFICANT CHANGE UP (ref 34.5–45)
HGB BLD-MCNC: 11.8 G/DL — SIGNIFICANT CHANGE UP (ref 11.5–15.5)
MCHC RBC-ENTMCNC: 29.4 PG — SIGNIFICANT CHANGE UP (ref 27–34)
MCHC RBC-ENTMCNC: 32.1 GM/DL — SIGNIFICANT CHANGE UP (ref 32–36)
MCV RBC AUTO: 91.5 FL — SIGNIFICANT CHANGE UP (ref 80–100)
NRBC # BLD: 0 /100 WBCS — SIGNIFICANT CHANGE UP (ref 0–0)
PLATELET # BLD AUTO: 317 K/UL — SIGNIFICANT CHANGE UP (ref 150–400)
POTASSIUM SERPL-MCNC: 3.7 MMOL/L — SIGNIFICANT CHANGE UP (ref 3.5–5.3)
POTASSIUM SERPL-SCNC: 3.7 MMOL/L — SIGNIFICANT CHANGE UP (ref 3.5–5.3)
RBC # BLD: 4.02 M/UL — SIGNIFICANT CHANGE UP (ref 3.8–5.2)
RBC # FLD: 14.6 % — HIGH (ref 10.3–14.5)
SODIUM SERPL-SCNC: 137 MMOL/L — SIGNIFICANT CHANGE UP (ref 135–145)
WBC # BLD: 9.61 K/UL — SIGNIFICANT CHANGE UP (ref 3.8–10.5)
WBC # FLD AUTO: 9.61 K/UL — SIGNIFICANT CHANGE UP (ref 3.8–10.5)

## 2020-02-03 PROCEDURE — 99223 1ST HOSP IP/OBS HIGH 75: CPT

## 2020-02-03 RX ORDER — AZITHROMYCIN 500 MG/1
500 TABLET, FILM COATED ORAL DAILY
Refills: 0 | Status: DISCONTINUED | OUTPATIENT
Start: 2020-02-03 | End: 2020-02-05

## 2020-02-03 RX ORDER — CEFPODOXIME PROXETIL 100 MG
100 TABLET ORAL EVERY 12 HOURS
Refills: 0 | Status: DISCONTINUED | OUTPATIENT
Start: 2020-02-03 | End: 2020-02-05

## 2020-02-03 RX ADMIN — Medication 100 MILLIGRAM(S): at 17:47

## 2020-02-03 RX ADMIN — ATORVASTATIN CALCIUM 10 MILLIGRAM(S): 80 TABLET, FILM COATED ORAL at 22:59

## 2020-02-03 RX ADMIN — MEMANTINE HYDROCHLORIDE 10 MILLIGRAM(S): 10 TABLET ORAL at 06:00

## 2020-02-03 RX ADMIN — Medication 3 MILLILITER(S): at 11:18

## 2020-02-03 RX ADMIN — AZITHROMYCIN 500 MILLIGRAM(S): 500 TABLET, FILM COATED ORAL at 17:46

## 2020-02-03 RX ADMIN — MEMANTINE HYDROCHLORIDE 10 MILLIGRAM(S): 10 TABLET ORAL at 17:24

## 2020-02-03 RX ADMIN — Medication 3 MILLILITER(S): at 05:56

## 2020-02-03 RX ADMIN — HEPARIN SODIUM 5000 UNIT(S): 5000 INJECTION INTRAVENOUS; SUBCUTANEOUS at 06:00

## 2020-02-03 RX ADMIN — Medication 3 MILLILITER(S): at 17:16

## 2020-02-03 RX ADMIN — Medication 100 MILLIGRAM(S): at 12:09

## 2020-02-03 RX ADMIN — Medication 3 MILLILITER(S): at 23:36

## 2020-02-03 RX ADMIN — Medication 0.25 MILLIGRAM(S): at 06:00

## 2020-02-03 RX ADMIN — HEPARIN SODIUM 5000 UNIT(S): 5000 INJECTION INTRAVENOUS; SUBCUTANEOUS at 17:26

## 2020-02-03 RX ADMIN — Medication 20 MILLIGRAM(S): at 06:00

## 2020-02-03 RX ADMIN — Medication 240 MILLIGRAM(S): at 06:00

## 2020-02-03 NOTE — CONSULT NOTE ADULT - SUBJECTIVE AND OBJECTIVE BOX
CARDIOLOGY CONSULTATION NOTE                                                                               AMILCAR GALVEZ is a 89y Female well known to me for many years, with a known pmhx of HTN, HLD, Afib h/o pacemaker, advanced dementia.   Last seen in my office on 1/16/20 - no resp distress at that time, PPM was interrogated on that visit.    She presented to the ED with 10 days of cough. She was examined at an urgent care and referred to the Ed because of an O2 sat of 89. No recrded c/o chest pain, fevers, abdominal pain. At baseline mental status, nonverbal.  In the ED patient has abnormal chest xray suggestive of CHF/ PNA.  O2 sats have been adequate. patient is DNR.      REVIEW OF SYSTEMS: NA, patient is non verbal  --------------------------      Home Medications:  allopurinol 100 mg oral tablet: 1 tab(s) orally once a day (15 Shahid 2016 05:47)  allopurinol 100 mg oral tablet:  orally once a day in am (16 Nov 2017 13:14)  ALPRAZolam 0.25 mg oral tablet:  orally once a day in am (16 Nov 2017 13:14)  atorvastatin 10 mg oral tablet: 1 tab(s) orally once a day (at bedtime) (16 Nov 2017 13:14)  atorvastatin 10 mg oral tablet: 1 tab(s) orally once a day (at bedtime) (15 Shahid 2016 05:47)  Calcium 600+D oral tablet:  orally once a day in am (16 Nov 2017 13:14)  calcium carbonate 600 mg oral tablet, chewable: 1 tab(s) orally once a day (15 Shahid 2016 05:47)  Cartia  mg/24 hours oral capsule, extended release: 1 cap(s) orally once a day in am   (16 Nov 2017 13:14)  digoxin 250 mcg (0.25 mg) oral tablet: 1 tab(s) orally once a day (17 Jan 2016 10:19)  digoxin 250 mcg (0.25 mg) oral tablet: 1 tab(s) orally once a day in am (16 Nov 2017 13:14)  diltiazem 240 mg/24 hours oral tablet, extended release: 1 tab(s) orally once a day (15 Shahid 2016 05:47)  docusate sodium 100 mg oral capsule:  orally once a day in am (16 Nov 2017 13:14)  ferrous sulfate: 130 milligram(s) orally once a day in am (16 Nov 2017 13:14)  multivitamin: 1 tab(s) orally once a day (15 Shahid 2016 05:47)  multivitamin:   once a day in am last dose 3/15 (16 Nov 2017 13:14)  Namzaric 28 mg-10 mg oral capsule, extended release: 1 cap(s) orally once a day in am (16 Nov 2017 13:14)  Namzaric 28 mg-10 mg oral capsule, extended release: 1 cap(s) orally once a day (15 Shahid 2016 05:47)  Vitamin D3 2000 intl units oral capsule: 1 cap(s) orally once a day (15 Shahid 2016 05:47)  Vitamin D3 2000 intl units oral capsule: 1 cap(s) orally once a day deisy (16 Nov 2017 13:14)  Zetia 10 mg oral tablet: 1 tab(s) orally once a day (15 Shahid 2016 05:47)  Zetia 10 mg oral tablet:  orally once a day (at bedtime) (16 Nov 2017 13:14)      MEDICATIONS  (STANDING):  albuterol/ipratropium for Nebulization 3 milliLiter(s) Nebulizer every 6 hours  allopurinol 100 milliGRAM(s) Oral daily  atorvastatin 10 milliGRAM(s) Oral at bedtime  azithromycin  IVPB 500 milliGRAM(s) IV Intermittent every 24 hours  cefTRIAXone   IVPB 1000 milliGRAM(s) IV Intermittent every 24 hours  digoxin Tablet 0.25 milliGRAM(s) Oral daily  diltiazem  milliGRAM(s) Oral daily  furosemide   Injectable 20 milliGRAM(s) IV Push daily  heparin  Injectable 5000 Unit(s) SubCutaneous two times a day  memantine 10 milliGRAM(s) Oral two times a day      ALLERGIES: No Known Allergies      FAMILY HISTORY:      PHYSICAL EXAMINATION:  -----------------------------  T(C): 36.8 (02-03-20 @ 05:18), Max: 36.8 (02-03-20 @ 05:18)  HR: 70 (02-03-20 @ 07:00) (70 - 83)  BP: 123/81 (02-03-20 @ 05:18) (121/55 - 140/83)  RR: 18 (02-03-20 @ 05:18) (12 - 20)  SpO2: 98% (02-03-20 @ 06:57) (95% - 100%)  Wt(kg): --    02-02 @ 07:01  -  02-03 @ 07:00  --------------------------------------------------------  IN:    Oral Fluid: 100 mL  Total IN: 100 mL    OUT:  Total OUT: 0 mL    Total NET: 100 mL            Constitutional: well developed, normal appearance, well groomed, well nourished, no deformities and no acute distress.   Eyes: the conjunctiva exhibited no abnormalities and the eyelids demonstrated no xanthelasmas.   HEENT: normal oral mucosa, no oral pallor and no oral cyanosis.   Neck: normal jugular venous A waves present, normal jugular venous V waves present and no jugular venous mckeon A waves.   Pulmonary: no respiratory distress, normal respiratory rhythm and effort, no accessory muscle use and lungs were clear to auscultation bilaterally.   Cardiovascular: heart rate and rhythm were normal, normal S1 and S2 and no murmur, gallop, rub, heave or thrill are present.   Abdomen: soft, non-tender, no hepato-splenomegaly and no abdominal mass palpated.   Musculoskeletal: the gait could not be assessed..   Extremities: no clubbing of the fingernails, no localized cyanosis, no petechial hemorrhages and no ischemic changes.   Skin: normal skin color and pigmentation, no rash, no venous stasis, no skin lesions, no skin ulcer and no xanthoma was observed.   Psychiatric: oriented to person, place, and time, the affect was normal, the mood was normal and not feeling anxious.     ECG:  -------        LABS:   --------  02-03    137  |  102  |  28<H>  ----------------------------<  93  3.7   |  27  |  0.68    Ca    8.4<L>      03 Feb 2020 08:15    TPro  6.8  /  Alb  2.1<L>  /  TBili  0.3  /  DBili  x   /  AST  17  /  ALT  18  /  AlkPhos  86  02-01                         11.8   9.61  )-----------( 317      ( 03 Feb 2020 08:15 )             36.8             Culture Results:   No growth to date. (02-02-20 @ 01:48)  Culture Results:   No growth to date. (02-02-20 @ 01:48)  Culture Results:   No growth (02-02-20 @ 01:32)      RADIOLOGY REPORTS:  -----------------------------  < from: Xray Chest 1 View- PORTABLE-Urgent (02.01.20 @ 18:19) >    EXAM:  XR CHEST PORTABLE URGENT 1V                            PROCEDURE DATE:  02/01/2020          INTERPRETATION:  PROCEDURE: AP view of the chest.    CLINICAL INFORMATION: Cough and shortness of breath.    COMPARISON: 3/24/2016.    FINDINGS:     There is a cardiac conduction device overlying the left axilla with intact leads to the heart.    Lungs: There is vascular congestion with bilateral lung opacities. Small bilateral pleural effusions are noted.  Heart: There is cardiomegaly.  Mediastinum: The mediastinum is within normal limits.    IMPRESSION:    Vascular congestion and bilateral pleural effusions.  Bilateral lung opacities, which may represent pulmonary edema or pneumonia.    JATIN MUIR M.D., ATTENDING RADIOLOGIST  This document has been electronically signed. Feb 2 2020  8:34AM          ECHOCARDIOGRAM:  ---------------------------    Last TTe from office in 6/22/16 - EF=55-60%, biatrial dilatation, Mild AS, MR, TR, moderate pulm HTN (RVSP=51mmHg).

## 2020-02-03 NOTE — PROGRESS NOTE ADULT - SUBJECTIVE AND OBJECTIVE BOX
Patient is a 89y old  Female who presents with a chief complaint of chf/ PNA, a.fib.  dementia/ elevated WBC count/Elevated Probnp. (2020 09:36)  cliniclly improved. no distress   dnr status   More alert than yesterday.    WBC trending down      INTERVAL HPI/OVERNIGHT EVENTS:  PAST MEDICAL & SURGICAL HISTORY:  Cardiac pacemaker  HTN (hypertension)  Alzheimer's dementia  LOLY (iron deficiency anemia)  Gout  Anxiety  Macular degeneration  Afib: was on coumadin until early , was discontinue d/t fall rsks per son ( lucila )  Head trauma: 2 subdural hematomas 2015  Cataract: left eye  Cholecystitis  Rotator cuff arthropathy: left shoulder  Uterine fibroid  Anemia  Infected hand  CA - breast cancer  Gout  High cholesterol  Hypertension  S/P cataract surgery, right:   S/P mastectomy, left:   S/P cataract surgery, left:   S/P knee replacement:   S/P cholecystectomy:   S/P rotator cuff repair:  - left  S/P hysterectomy: partial   History of cholecystectomy  History of knee replacement procedure of right knee  H/O total hysterectomy  History of total mastectomy of right breast  Gallbladder & bile duct stone      MEDICATIONS  (STANDING):  albuterol/ipratropium for Nebulization 3 milliLiter(s) Nebulizer every 6 hours  allopurinol 100 milliGRAM(s) Oral daily  atorvastatin 10 milliGRAM(s) Oral at bedtime  azithromycin  IVPB 500 milliGRAM(s) IV Intermittent every 24 hours  cefTRIAXone   IVPB 1000 milliGRAM(s) IV Intermittent every 24 hours  digoxin     Tablet 0.25 milliGRAM(s) Oral daily  diltiazem    milliGRAM(s) Oral daily  furosemide   Injectable 20 milliGRAM(s) IV Push daily  heparin  Injectable 5000 Unit(s) SubCutaneous two times a day  memantine 10 milliGRAM(s) Oral two times a day    MEDICATIONS  (PRN):  acetaminophen   Tablet .. 650 milliGRAM(s) Oral every 6 hours PRN Mild Pain (1 - 3)      Allergies    No Known Allergies    Intolerances        REVIEW OF SYSTEMS:  CONSTITUTIONAL: No fever, weight loss, or fatigue  EYES: No eye pain, visual disturbances, or discharge  ENMT:  No difficulty hearing, tinnitus, vertigo; No sinus or throat pain  NECK: No pain or stiffness  BREASTS: No pain, masses, or nipple discharge  RESPIRATORY: No cough, wheezing, chills or hemoptysis; No shortness of breath  CARDIOVASCULAR: No chest pain, palpitations, dizziness, or leg swelling  GASTROINTESTINAL: No abdominal or epigastric pain. No nausea, vomiting, or hematemesis; No diarrhea or constipation. No melena or hematochezia.  GENITOURINARY: No dysuria, frequency, hematuria, or incontinence  NEUROLOGICAL: No headaches, memory loss, loss of strength, numbness, or tremors  SKIN: No itching, burning, rashes, or lesions   LYMPH NODES: No enlarged glands  ENDOCRINE: No heat or cold intolerance; No hair loss  MUSCULOSKELETAL: No joint pain or swelling; No muscle, back, or extremity pain  PSYCHIATRIC: No depression, anxiety, mood swings, or difficulty sleeping  HEME/LYMPH: No easy bruising, or bleeding gums  ALLERY AND IMMUNOLOGIC: No hives or eczema    Vital Signs Last 24 Hrs  T(C): 36.8 (2020 05:18), Max: 36.8 (2020 05:18)  T(F): 98.3 (2020 05:18), Max: 98.3 (2020 05:18)  HR: 70 (2020 07:00) (70 - 83)  BP: 123/81 (2020 05:18) (121/55 - 140/83)  BP(mean): --  RR: 18 (2020 05:18) (12 - 20)  SpO2: 98% (2020 06:57) (95% - 100%)    PHYSICAL EXAM:  GENERAL: NAD, well-groomed, well-developed  HEAD:  Atraumatic, Normocephalic  EYES: EOMI, PERRLA, conjunctiva and sclera clear  ENMT: No tonsillar erythema, exudates, or enlargement; Moist mucous membranes, Good dentition, No lesions  NECK: Supple, No JVD, Normal thyroid  NERVOUS SYSTEM:  Demented  CHEST/LUNG: Clear to percussion bilaterally; No rales, rhonchi, wheezing, or rubs  HEART: Regular rate and rhythm;  systolic murmer all over precordium  ABDOMEN: Soft, Nontender, Nondistended; Bowel sounds present  EXTREMITIES:  2+ Peripheral Pulses, No clubbing, cyanosis, or edema  LYMPH: No lymphadenopathy noted  SKIN: No rashes or lesions    LABS:                        11.8   9.61  )-----------( 317      ( 2020 08:15 )             36.8     02-03    137  |  102  |  28<H>  ----------------------------<  93  3.7   |  27  |  0.68    Ca    8.4<L>      2020 08:15    TPro  6.8  /  Alb  2.1<L>  /  TBili  0.3  /  DBili  x   /  AST  17  /  ALT  18  /  AlkPhos  86  02-        Urinalysis Basic - ( 2020 18:10 )    Color: Yellow / Appearance: Clear / S.020 / pH: x  Gluc: x / Ketone: Negative  / Bili: Negative / Urobili: Negative mg/dL   Blood: x / Protein: 30 mg/dL / Nitrite: Negative   Leuk Esterase: Negative / RBC: 0-2 /HPF / WBC 3-5   Sq Epi: x / Non Sq Epi: Occasional / Bacteria: Few          Imaging Personally Reviewed:  [ ] YES  [ ] NO    Consultant(s) Notes Reviewed:  [ ] YES  [ ] NO    Care Discussed with Consultants/Other Providers [ ] YES  [ ] NO    Care discussed with family,         [  ]   yes  [  ]  No    imp:    stable[ ]    unstable[  ]     improving [ x  ]       unchanged  [  ]                Plans:  Continue present plans  [x  ] cardiology evaluation to follow.                New consult [  ]   specialty  .......               order test[  ]    test name.  probnp                 Discharge Planning  [  ]

## 2020-02-03 NOTE — CONSULT NOTE ADULT - REASON FOR ADMISSION
chf/ PNA, a.fib.  dementia/ elevated WBC count/Elevated Probnp.
chf/ PNA, a.fib.  dementia/ elevated WBC count/Elevated Probnp.

## 2020-02-03 NOTE — CONSULT NOTE ADULT - ASSESSMENT
The chart has been reviewed but the patient has not yet been examined.  Full note to follow. 89-year-old female with advanced dementia, DNR.  History of chronic atrial fibrillation, hypertension, hyperlipidemia.  Status post pacemaker.  Admitted with evidence of respiratory distress and hypoxemia with chest x-ray suggestive of vascular congestion and bilateral lung opacification.  Being treated for both heart failure and pneumonia.  Last echo in office from 2016 showed normal left ventricular function with evidence of mild left ear, MR, TR and moderate pulmonary hypertension.  Patient has been hemodynamically stable since admission.    Plan:  Conservative management given age, dementia, debility.  Patient's O2 sats have been normal.  Continue treatment with her calcium channel blocker and digoxin.  Can change IV Jayleen Lasix to p.o. Lasix.  Patient appears to be at baseline, consider discharge planning.

## 2020-02-03 NOTE — PROGRESS NOTE ADULT - ASSESSMENT
CHF / PNA  pacemaker status   HTn  dementia  A.fib  on broad spectrum antibiotics    leukocytosis down to normal , no fever   clonically ok , aid at bedside   will switch to po vantin and zithromax for 5 days

## 2020-02-03 NOTE — PROGRESS NOTE ADULT - SUBJECTIVE AND OBJECTIVE BOX
HPI:  Pt is a 88 yo lady with a pmhx of HTN, HL, Afib on pacemaker, dementia who presents to the ED with cough. Has had 10 days of cough. Went to urgent care today and O2 sat was 89 and sent to ED. No chest pain, no fevers at home. No abdominal pain. At baseline mental status, nonverbal.   in the Ed patient has abnormal chest xray showing chf/ PNA. . O2 sat is 98 on o2. (2020 20:26)      Allergies    No Known Allergies    Intolerances        MEDICATIONS  (STANDING):  albuterol/ipratropium for Nebulization 3 milliLiter(s) Nebulizer every 6 hours  allopurinol 100 milliGRAM(s) Oral daily  atorvastatin 10 milliGRAM(s) Oral at bedtime  azithromycin   Tablet 500 milliGRAM(s) Oral daily  cefpodoxime 100 milliGRAM(s) Oral every 12 hours  digoxin     Tablet 0.25 milliGRAM(s) Oral daily  diltiazem    milliGRAM(s) Oral daily  furosemide   Injectable 20 milliGRAM(s) IV Push daily  heparin  Injectable 5000 Unit(s) SubCutaneous two times a day  memantine 10 milliGRAM(s) Oral two times a day    MEDICATIONS  (PRN):  acetaminophen   Tablet .. 650 milliGRAM(s) Oral every 6 hours PRN Mild Pain (1 - 3)      REVIEW OF SYSTEMS:    poor historians     VITAL SIGNS:  T(C): 35.8 (20 @ 11:08), Max: 36.8 (20 @ 05:18)  T(F): 96.4 (20 @ 11:08), Max: 98.3 (20 @ 05:18)  HR: 79 (20 @ 12:46) (70 - 83)  BP: 123/59 (20 @ 11:08) (122/63 - 140/83)  RR: 19 (20 @ 11:08) (12 - 20)  SpO2: 97% (20 @ 12:46) (97% - 100%)  Wt(kg): --    PHYSICAL EXAM:    GENERAL: not in any distress  HEENT: Neck is supple, normocephalic, atraumatic   CHEST/LUNG: Clear to percussion bilaterally; No rales, rhonchi, wheezing  HEART: Regular rate and rhythm; No murmurs, rubs, or gallops  ABDOMEN: Soft, Nontender, Nondistended; Bowel sounds present, no rebound   EXTREMITIES:  2+ Peripheral Pulses, No clubbing, cyanosis, or edema  GENITOURINARY:   SKIN: No rashes or lesions  BACK: no pressor sore   NERVOUS SYSTEM:  Alert & lethargic     LABS:                         11.8   9.61  )-----------( 317      ( 2020 08:15 )             36.8         137  |  102  |  28<H>  ----------------------------<  93  3.7   |  27  |  0.68    Ca    8.4<L>      2020 08:15    TPro  6.8  /  Alb  2.1<L>  /  TBili  0.3  /  DBili  x   /  AST  17  /  ALT  18  /  AlkPhos  86  02-01    LIVER FUNCTIONS - ( 2020 17:59 )  Alb: 2.1 g/dL / Pro: 6.8 gm/dL / ALK PHOS: 86 U/L / ALT: 18 U/L / AST: 17 U/L / GGT: x             Urinalysis Basic - ( 2020 18:10 )    Color: Yellow / Appearance: Clear / S.020 / pH: x  Gluc: x / Ketone: Negative  / Bili: Negative / Urobili: Negative mg/dL   Blood: x / Protein: 30 mg/dL / Nitrite: Negative   Leuk Esterase: Negative / RBC: 0-2 /HPF / WBC 3-5   Sq Epi: x / Non Sq Epi: Occasional / Bacteria: Few                          Culture Results:   No growth to date. ( @ 01:48)  Culture Results:   No growth to date. ( @ 01:48)  Culture Results:   No growth ( @ 01:32)                Radiology:

## 2020-02-04 LAB
ANION GAP SERPL CALC-SCNC: 9 MMOL/L — SIGNIFICANT CHANGE UP (ref 5–17)
BUN SERPL-MCNC: 37 MG/DL — HIGH (ref 7–23)
CALCIUM SERPL-MCNC: 8.5 MG/DL — SIGNIFICANT CHANGE UP (ref 8.5–10.1)
CHLORIDE SERPL-SCNC: 101 MMOL/L — SIGNIFICANT CHANGE UP (ref 96–108)
CO2 SERPL-SCNC: 27 MMOL/L — SIGNIFICANT CHANGE UP (ref 22–31)
CREAT SERPL-MCNC: 0.81 MG/DL — SIGNIFICANT CHANGE UP (ref 0.5–1.3)
GLUCOSE SERPL-MCNC: 107 MG/DL — HIGH (ref 70–99)
HCT VFR BLD CALC: 35.7 % — SIGNIFICANT CHANGE UP (ref 34.5–45)
HGB BLD-MCNC: 11.4 G/DL — LOW (ref 11.5–15.5)
MCHC RBC-ENTMCNC: 29.2 PG — SIGNIFICANT CHANGE UP (ref 27–34)
MCHC RBC-ENTMCNC: 31.9 GM/DL — LOW (ref 32–36)
MCV RBC AUTO: 91.3 FL — SIGNIFICANT CHANGE UP (ref 80–100)
NRBC # BLD: 0 /100 WBCS — SIGNIFICANT CHANGE UP (ref 0–0)
NT-PROBNP SERPL-SCNC: HIGH PG/ML (ref 0–450)
PLATELET # BLD AUTO: 318 K/UL — SIGNIFICANT CHANGE UP (ref 150–400)
POTASSIUM SERPL-MCNC: 4.2 MMOL/L — SIGNIFICANT CHANGE UP (ref 3.5–5.3)
POTASSIUM SERPL-SCNC: 4.2 MMOL/L — SIGNIFICANT CHANGE UP (ref 3.5–5.3)
PROCALCITONIN SERPL-MCNC: 0.4 NG/ML — HIGH (ref 0.02–0.1)
RBC # BLD: 3.91 M/UL — SIGNIFICANT CHANGE UP (ref 3.8–5.2)
RBC # FLD: 14.7 % — HIGH (ref 10.3–14.5)
SODIUM SERPL-SCNC: 137 MMOL/L — SIGNIFICANT CHANGE UP (ref 135–145)
WBC # BLD: 11.46 K/UL — HIGH (ref 3.8–10.5)
WBC # FLD AUTO: 11.46 K/UL — HIGH (ref 3.8–10.5)

## 2020-02-04 PROCEDURE — 99232 SBSQ HOSP IP/OBS MODERATE 35: CPT

## 2020-02-04 RX ADMIN — HEPARIN SODIUM 5000 UNIT(S): 5000 INJECTION INTRAVENOUS; SUBCUTANEOUS at 07:04

## 2020-02-04 RX ADMIN — ATORVASTATIN CALCIUM 10 MILLIGRAM(S): 80 TABLET, FILM COATED ORAL at 22:27

## 2020-02-04 RX ADMIN — Medication 3 MILLILITER(S): at 05:16

## 2020-02-04 RX ADMIN — Medication 100 MILLIGRAM(S): at 11:36

## 2020-02-04 RX ADMIN — Medication 100 MILLIGRAM(S): at 17:15

## 2020-02-04 RX ADMIN — Medication 20 MILLIGRAM(S): at 11:49

## 2020-02-04 RX ADMIN — Medication 0.25 MILLIGRAM(S): at 06:59

## 2020-02-04 RX ADMIN — MEMANTINE HYDROCHLORIDE 10 MILLIGRAM(S): 10 TABLET ORAL at 06:59

## 2020-02-04 RX ADMIN — Medication 3 MILLILITER(S): at 11:07

## 2020-02-04 RX ADMIN — Medication 240 MILLIGRAM(S): at 06:59

## 2020-02-04 RX ADMIN — Medication 3 MILLILITER(S): at 17:07

## 2020-02-04 RX ADMIN — MEMANTINE HYDROCHLORIDE 10 MILLIGRAM(S): 10 TABLET ORAL at 17:15

## 2020-02-04 RX ADMIN — AZITHROMYCIN 500 MILLIGRAM(S): 500 TABLET, FILM COATED ORAL at 17:15

## 2020-02-04 RX ADMIN — HEPARIN SODIUM 5000 UNIT(S): 5000 INJECTION INTRAVENOUS; SUBCUTANEOUS at 17:15

## 2020-02-04 RX ADMIN — Medication 100 MILLIGRAM(S): at 07:00

## 2020-02-04 NOTE — PROGRESS NOTE ADULT - SUBJECTIVE AND OBJECTIVE BOX
Patient is a 89y old  Female who presents with a chief complaint of chf/ PNA, a.fib.  dementia/ elevated WBC count/Elevated Probnp. (03 Feb 2020 16:20)  patient is at baseline.    antibiotics switched to Po vantin and azithromycin  . . No distress. nonverbal.  No decubiti    INTERVAL HPI/OVERNIGHT EVENTS: un eventful  PAST MEDICAL & SURGICAL HISTORY:  Cardiac pacemaker  HTN (hypertension)  Alzheimer's dementia  LOLY (iron deficiency anemia)  Gout  Anxiety  Macular degeneration  Afib: was on coumadin until early 2015, was discontinue d/t fall rsks per son ( lucila )  Head trauma: 2 subdural hematomas 5/2015  Cataract: left eye  Cholecystitis  Rotator cuff arthropathy: left shoulder  Uterine fibroid  Anemia  Infected hand  CA - breast cancer  Gout  High cholesterol  Hypertension  S/P cataract surgery, right: 2006  S/P mastectomy, left: 2004  S/P cataract surgery, left: 2003  S/P knee replacement: 2003  S/P cholecystectomy: 2002  S/P rotator cuff repair: 2002 - left  S/P hysterectomy: partial 1992  History of cholecystectomy  History of knee replacement procedure of right knee  H/O total hysterectomy  History of total mastectomy of right breast  Gallbladder & bile duct stone      MEDICATIONS  (STANDING):  albuterol/ipratropium for Nebulization 3 milliLiter(s) Nebulizer every 6 hours  allopurinol 100 milliGRAM(s) Oral daily  atorvastatin 10 milliGRAM(s) Oral at bedtime  azithromycin   Tablet 500 milliGRAM(s) Oral daily  cefpodoxime 100 milliGRAM(s) Oral every 12 hours  digoxin     Tablet 0.25 milliGRAM(s) Oral daily  diltiazem    milliGRAM(s) Oral daily  furosemide   Injectable 20 milliGRAM(s) IV Push daily  heparin  Injectable 5000 Unit(s) SubCutaneous two times a day  memantine 10 milliGRAM(s) Oral two times a day    MEDICATIONS  (PRN):  acetaminophen   Tablet .. 650 milliGRAM(s) Oral every 6 hours PRN Mild Pain (1 - 3)      Allergies    No Known Allergies    Intolerances        REVIEW OF SYSTEMS:  CONSTITUTIONAL: No fever, weight loss, or fatigue  EYES: No eye pain, visual disturbances, or discharge  ENMT:  No difficulty hearing, tinnitus, vertigo; No sinus or throat pain  NECK: No pain or stiffness  BREASTS: No pain, masses, or nipple discharge  RESPIRATORY: No cough, wheezing, chills or hemoptysis; No shortness of breath  CARDIOVASCULAR: No chest pain, palpitations, dizziness, or leg swelling  GASTROINTESTINAL: No abdominal or epigastric pain. No nausea, vomiting, or hematemesis; No diarrhea or constipation. No melena or hematochezia.  GENITOURINARY: No dysuria, frequency, hematuria, or incontinence  NEUROLOGICAL: No headaches, memory loss, loss of strength, numbness, or tremors  SKIN: No itching, burning, rashes, or lesions   LYMPH NODES: No enlarged glands  ENDOCRINE: No heat or cold intolerance; No hair loss  MUSCULOSKELETAL: No joint pain or swelling; No muscle, back, or extremity pain  PSYCHIATRIC: No depression, anxiety, mood swings, or difficulty sleeping  HEME/LYMPH: No easy bruising, or bleeding gums  ALLERY AND IMMUNOLOGIC: No hives or eczema    Vital Signs Last 24 Hrs  T(C): 36 (04 Feb 2020 05:47), Max: 37.4 (03 Feb 2020 20:16)  T(F): 96.8 (04 Feb 2020 05:47), Max: 99.4 (03 Feb 2020 20:16)  HR: 71 (04 Feb 2020 05:47) (70 - 87)  BP: 162/72 (04 Feb 2020 05:47) (123/59 - 162/72)  BP(mean): 81 (03 Feb 2020 11:08) (81 - 81)  RR: 20 (04 Feb 2020 05:47) (19 - 28)  SpO2: 95% (04 Feb 2020 05:47) (95% - 100%)    PHYSICAL EXAM:  GENERAL: NAD, well-groomed, well-developed  HEAD:  Atraumatic, Normocephalic  EYES: EOMI, PERRLA, conjunctiva and sclera clear  ENMT: No tonsillar erythema, exudates, or enlargement; Moist mucous membranes, Good dentition, No lesions  NECK: Supple, No JVD, Normal thyroid  NERVOUS SYSTEM:  Non verbal, demented.   CHEST/LUNG: Clear to percussion bilaterally; No rales, rhonchi, wheezing, or rubs  HEART: Regular rate and rhythm; No murmurs, rubs, or gallops  ABDOMEN: Soft, Nontender, Nondistended; Bowel sounds present  EXTREMITIES:  2+ Peripheral Pulses, No clubbing, cyanosis, or edema  LYMPH: No lymphadenopathy noted  SKIN: No rashes or lesions    LABS:                        11.4   11.46 )-----------( 318      ( 04 Feb 2020 07:05 )             35.7     02-04    137  |  101  |  37<H>  ----------------------------<  107<H>  4.2   |  27  |  0.81    Ca    8.5      04 Feb 2020 07:05          Imaging Personally Reviewed:  [ ] YES  [ ] NO    Consultant(s) Notes Reviewed:  [ ] YES  [ ] NO    Care Discussed with Consultants/Other Providers [ ] YES  [ ] NO    Care discussed with family,         [  ]   yes  [  ]  No    imp:    stable[ x]    unstable[  ]     improving [   ]       unchanged  [  ]                Plans:  Continue present plans  [  x] discharge planning for tomorrow.               New consult [  ]   specialty  .......               order test[  ]    test name.                  Discharge Planning  [x  ]

## 2020-02-04 NOTE — PROGRESS NOTE ADULT - SUBJECTIVE AND OBJECTIVE BOX
Patient is a 89y old  Female who presents with a chief complaint of chf/ PNA, a.fib.  dementia/ elevated WBC count/Elevated Probnp. (04 Feb 2020 10:17)    PAST MEDICAL & SURGICAL HISTORY:  Cardiac pacemaker  HTN (hypertension)  Alzheimer's dementia  LOLY (iron deficiency anemia)  Gout  Anxiety  Macular degeneration  Afib: was on coumadin until early 2015, was discontinued d/t fall rsks per son ( lucila )  Head trauma: 2 subdural hematomas 5/2015  Cholecystitis  Uterine fibroid  Anemia  Infected hand  CA - breast cancer  High cholesterol  S/P cataract surgery, right: 2006, left: 2003  S/P total mastectomy, left: 2004  S/P knee replacement: 2003  S/P cholecystectomy: 2002  S/P rotator cuff repair: 2002 - left  S/P total hysterectomy: partial 1992  Gallbladder & bile duct stone    INTERVAL HISTORY:  	  MEDICATIONS:  MEDICATIONS  (STANDING):  albuterol/ipratropium for Nebulization 3 milliLiter(s) Nebulizer every 6 hours  allopurinol 100 milliGRAM(s) Oral daily  atorvastatin 10 milliGRAM(s) Oral at bedtime  azithromycin   Tablet 500 milliGRAM(s) Oral daily  cefpodoxime 100 milliGRAM(s) Oral every 12 hours  digoxin     Tablet 0.25 milliGRAM(s) Oral daily  diltiazem    milliGRAM(s) Oral daily  furosemide   Injectable 20 milliGRAM(s) IV Push daily  heparin  Injectable 5000 Unit(s) SubCutaneous two times a day  memantine 10 milliGRAM(s) Oral two times a day    MEDICATIONS  (PRN):  acetaminophen   Tablet .. 650 milliGRAM(s) Oral every 6 hours PRN Mild Pain (1 - 3)    Vitals:  T(F): 96.8 (02-04-20 @ 05:47), Max: 99.4 (02-03-20 @ 20:16)  HR: 71 (02-04-20 @ 05:47) (70 - 87)  BP: 162/72 (02-04-20 @ 05:47) (123/59 - 162/72)  RR: 20 (02-04-20 @ 05:47) (19 - 28)  SpO2: 95% (02-04-20 @ 05:47) (95% - 100%)  Wt(kg): --    02-03 @ 07:01  -  02-04 @ 07:00  --------------------------------------------------------  IN:    Oral Fluid: 237 mL  Total IN: 237 mL    OUT:  Total OUT: 0 mL    Total NET: 237 mL        Weight (kg): 49.3 (02-02 @ 02:39)      PHYSICAL EXAM:  Neuro: Awake, responsive  CV: S1 S2 RRR  Lungs: CTABL  GI: Soft, BS +, ND, NT  Extremities: No edema    TELEMETRY:    RADIOLOGY: < from: Xray Chest 1 View- PORTABLE-Urgent (02.01.20 @ 18:19) >  There is a cardiac conduction device overlying the left axilla with intact leads to the heart.    Lungs: There is vascular congestion with bilateral lung opacities. Small bilateral pleural effusions are noted.  Heart: There is cardiomegaly.  Mediastinum: The mediastinum is within normal limits.    IMPRESSION:    Vascular congestion and bilateral pleural effusions.  Bilateral lung opacities, which may represent pulmonary edema or pneumonia.    < end of copied text >    DIAGNOSTIC TESTING:    [ ] Echocardiogram:    LABS:	 	  04 Feb 2020 07:05    137    |  101    |  37     ----------------------------<  107    4.2     |  27     |  0.81   03 Feb 2020 08:15    137    |  102    |  28     ----------------------------<  93     3.7     |  27     |  0.68   02 Feb 2020 06:40    140    |  107    |  28     ----------------------------<  88     3.8     |  26     |  0.64     Ca    8.5        04 Feb 2020 07:05                            11.4   11.46 )-----------( 318      ( 04 Feb 2020 07:05 )             35.7 ,                       11.8   9.61  )-----------( 317      ( 03 Feb 2020 08:15 )             36.8 ,                       11.7   11.28 )-----------( 288      ( 02 Feb 2020 06:40 )             36.5 ,                       13.2   15.79 )-----------( 329      ( 01 Feb 2020 17:59 )             41.1   proBNP: Serum Pro-Brain Natriuretic Peptide: 61500 pg/mL (02-04 @ 07:05)  Serum Pro-Brain Natriuretic Peptide: 12381 pg/mL (02-01 @ 17:59) Patient is a 89y old  Female who presents with a chief complaint of difficulty breathing.    PAST MEDICAL & SURGICAL HISTORY:  Cardiac pacemaker  HTN (hypertension)  Alzheimer's dementia  LOLY (iron deficiency anemia)  Gout  Anxiety  Macular degeneration  Afib: was on coumadin until early 2015, was discontinued d/t fall rsks per son ( lucila )  Head trauma: 2 subdural hematomas 5/2015  Cholecystitis  Uterine fibroid  Anemia  Infected hand  CA - breast cancer  High cholesterol  S/P cataract surgery, right: 2006, left: 2003  S/P total mastectomy, left: 2004  S/P knee replacement: 2003  S/P cholecystectomy: 2002  S/P rotator cuff repair: 2002 - left  S/P total hysterectomy: partial 1992  Gallbladder & bile duct stone    INTERVAL HISTORY: resting in bed, mild shortness of breath. Denies chest pain, palpitations. Would like to go home.  	  MEDICATIONS:  MEDICATIONS  (STANDING):  albuterol/ipratropium for Nebulization 3 milliLiter(s) Nebulizer every 6 hours  allopurinol 100 milliGRAM(s) Oral daily  atorvastatin 10 milliGRAM(s) Oral at bedtime  azithromycin   Tablet 500 milliGRAM(s) Oral daily  cefpodoxime 100 milliGRAM(s) Oral every 12 hours  digoxin     Tablet 0.25 milliGRAM(s) Oral daily  diltiazem    milliGRAM(s) Oral daily  furosemide   Injectable 20 milliGRAM(s) IV Push daily  heparin  Injectable 5000 Unit(s) SubCutaneous two times a day  memantine 10 milliGRAM(s) Oral two times a day    MEDICATIONS  (PRN):  acetaminophen   Tablet .. 650 milliGRAM(s) Oral every 6 hours PRN Mild Pain (1 - 3)    Vitals:  T(F): 96.8 (02-04-20 @ 05:47), Max: 99.4 (02-03-20 @ 20:16)  HR: 71 (02-04-20 @ 05:47) (70 - 87)  BP: 162/72 (02-04-20 @ 05:47) (123/59 - 162/72)  RR: 20 (02-04-20 @ 05:47) (19 - 28)  SpO2: 95% (02-04-20 @ 05:47) (95% - 100%)    02-03 @ 07:01  -  02-04 @ 07:00  --------------------------------------------------------  IN:    Oral Fluid: 237 mL  Total IN: 237 mL    OUT:  Total OUT: 0 mL    Total NET: 237 mL    Weight (kg): 49.3 (02-02 @ 02:39)      PHYSICAL EXAM:  Neuro: Awake, responsive  CV: S1 S2 RRR, +systolic murmur  Lungs: bibasilar rales  GI: Soft, BS +, ND, NT  Extremities: +UE and LE edema    RADIOLOGY: < from: Xray Chest 1 View- PORTABLE-Urgent (02.01.20 @ 18:19) >  There is a cardiac conduction device overlying the left axilla with intact leads to the heart.    Lungs: There is vascular congestion with bilateral lung opacities. Small bilateral pleural effusions are noted.  Heart: There is cardiomegaly.  Mediastinum: The mediastinum is within normal limits.    IMPRESSION:    Vascular congestion and bilateral pleural effusions.  Bilateral lung opacities, which may represent pulmonary edema or pneumonia.    < end of copied text >    LABS:	 	  04 Feb 2020 07:05    137    |  101    |  37     ----------------------------<  107    4.2     |  27     |  0.81   03 Feb 2020 08:15    137    |  102    |  28     ----------------------------<  93     3.7     |  27     |  0.68   02 Feb 2020 06:40    140    |  107    |  28     ----------------------------<  88     3.8     |  26     |  0.64     Ca    8.5        04 Feb 2020 07:05                        11.4   11.46 )-----------( 318      ( 04 Feb 2020 07:05 )             35.7 ,                       11.8   9.61  )-----------( 317      ( 03 Feb 2020 08:15 )             36.8 ,                       11.7   11.28 )-----------( 288      ( 02 Feb 2020 06:40 )             36.5 ,                       13.2   15.79 )-----------( 329      ( 01 Feb 2020 17:59 )             41.1   proBNP: Serum Pro-Brain Natriuretic Peptide: 45080 pg/mL (02-04 @ 07:05)  Serum Pro-Brain Natriuretic Peptide: 94967 pg/mL (02-01 @ 17:59)

## 2020-02-04 NOTE — PROGRESS NOTE ADULT - ASSESSMENT
89-year-old female with advanced dementia, chronic atrial fibrillation, hypertension, hyperlipidemia.  Status post pacemaker.  Admitted with evidence of respiratory distress and hypoxemia with chest x-ray suggestive of vascular congestion and bilateral lung opacification.   Last echo in office from 2016 showed normal left ventricular function with evidence of mild left ear, MR, TR and moderate pulmonary hypertension.  Patient has been hemodynamically stable since admission.    ·	Acute on chronic HFpEF  ·	PNA  ·	Chronic afib  ·	Essential HTN  ·	HLD  ·	Advanced dementia    Plan:  -Currently on IV Lasix, will switch to PO, monitor electrolytes/renal function/daily weight  -Antibiotics as per ID  -Cont. cardizem and digoxin, dig level on 2/1 1.59  -Cont. statin  -Cont. dementia meds  -Conservative management given age, dementia, debility. DNR status.   -D/C planning, patient will follow up w/ Dr. Lundberg for further cardiac management 89-year-old female with advanced dementia, chronic atrial fibrillation, hypertension, hyperlipidemia.  Status post pacemaker.  Admitted with evidence of respiratory distress and hypoxemia with chest x-ray suggestive of vascular congestion and bilateral lung opacification.   BNP 51457.  Last echo in office from 2016 showed normal left ventricular function with evidence of mild MR, TR and moderate pulmonary hypertension.  Patient has been hemodynamically stable since admission.    ·	Acute on chronic HFpEF  ·	PNA  ·	Chronic afib  ·	Essential HTN  ·	HLD  ·	Advanced dementia    Plan:  -Cont. on IV Lasix, still with bibasilar rales and extremity edema, monitor electrolytes/renal function/daily weight  -Antibiotics as per ID  -Cont. Cardizem and digoxin, dig level on 2/1 1.59  -Cont. statin  -Cont. dementia meds  -Conservative management given age, dementia, debility. DNR status.   -D/C planning, patient will follow up w/ Dr. Lundberg for further cardiac management 89-year-old female with advanced dementia, chronic atrial fibrillation, hypertension, hyperlipidemia.  Status post pacemaker.  Admitted with evidence of respiratory distress and hypoxemia with chest x-ray suggestive of vascular congestion and bilateral lung opacification.   BNP 23026.  Last echo in office from 2016 showed normal left ventricular function with evidence of mild AS, MR, TR and moderate pulmonary hypertension. Full report in chart.   Patient has been hemodynamically stable since admission.    ·	Acute on chronic HFpEF  ·	PNA  ·	Chronic afib  ·	Essential HTN  ·	HLD  ·	Advanced dementia    Plan:  -Cont. on IV Lasix, still with bibasilar rales and extremity edema, monitor electrolytes/renal function/daily weight  -Antibiotics as per ID  -Cont. Cardizem and digoxin, dig level on 2/1 1.59  -Cont. statin  -Cont. dementia meds  -Conservative management given age, dementia, debility. DNR status.   -D/C planning, patient will follow up w/ Dr. Lundberg for further cardiac management

## 2020-02-04 NOTE — PROGRESS NOTE ADULT - SUBJECTIVE AND OBJECTIVE BOX
HPI:  Pt is a 89 yo lady with a pmhx of HTN, HL, Afib on pacemaker, dementia who presents to the ED with cough. Has had 10 days of cough. Went to urgent care today and O2 sat was 89 and sent to ED. No chest pain, no fevers at home. No abdominal pain. At baseline mental status, nonverbal.   in the Ed patient has abnormal chest xray showing chf/ PNA. . O2 sat is 98 on o2. (01 Feb 2020 20:26)      Allergies    No Known Allergies    Intolerances        MEDICATIONS  (STANDING):  albuterol/ipratropium for Nebulization 3 milliLiter(s) Nebulizer every 6 hours  allopurinol 100 milliGRAM(s) Oral daily  atorvastatin 10 milliGRAM(s) Oral at bedtime  azithromycin   Tablet 500 milliGRAM(s) Oral daily  cefpodoxime 100 milliGRAM(s) Oral every 12 hours  digoxin     Tablet 0.25 milliGRAM(s) Oral daily  diltiazem    milliGRAM(s) Oral daily  heparin  Injectable 5000 Unit(s) SubCutaneous two times a day  memantine 10 milliGRAM(s) Oral two times a day    MEDICATIONS  (PRN):  acetaminophen   Tablet .. 650 milliGRAM(s) Oral every 6 hours PRN Mild Pain (1 - 3)      REVIEW OF SYSTEMS:  poor historian    VITAL SIGNS:  T(C): 36 (02-04-20 @ 05:47), Max: 37.4 (02-03-20 @ 20:16)  T(F): 96.8 (02-04-20 @ 05:47), Max: 99.4 (02-03-20 @ 20:16)  HR: 86 (02-04-20 @ 11:32) (70 - 88)  BP: 162/72 (02-04-20 @ 05:47) (129/63 - 162/72)  RR: 20 (02-04-20 @ 05:47) (20 - 28)  SpO2: 96% (02-04-20 @ 11:32) (95% - 97%)  Wt(kg): --    PHYSICAL EXAM:    GENERAL: not in any distress  HEENT: Neck is supple, normocephalic, atraumatic   CHEST/LUNG: Clear to percussion bilaterally; No rales, rhonchi, wheezing  HEART: Regular rate and rhythm; No murmurs, rubs, or gallops  ABDOMEN: Soft, Nontender, Nondistended; Bowel sounds present, no rebound   EXTREMITIES:  2+ Peripheral Pulses, No clubbing, cyanosis, or edema  GENITOURINARY:   SKIN: No rashes or lesions  BACK: no pressor sore   NERVOUS SYSTEM:  Alert & demented     LABS:                         11.4   11.46 )-----------( 318      ( 04 Feb 2020 07:05 )             35.7     02-04    137  |  101  |  37<H>  ----------------------------<  107<H>  4.2   |  27  |  0.81    Ca    8.5      04 Feb 2020 07:05                  Culture Results:   No growth to date. (02-02 @ 01:48)  Culture Results:   No growth to date. (02-02 @ 01:48)  Culture Results:   No growth (02-02 @ 01:32)                Radiology:

## 2020-02-04 NOTE — PROGRESS NOTE ADULT - ASSESSMENT
CHF / PNA  pacemaker status  HTN   dementia  A.fib  on broad spectrum antibiotics    leukocytosis down to normal , no fever   clonically mucbetter , aid at bedside   now on po vantin and zithromax for 5 days

## 2020-02-05 ENCOUNTER — TRANSCRIPTION ENCOUNTER (OUTPATIENT)
Age: 85
End: 2020-02-05

## 2020-02-05 VITALS
HEART RATE: 74 BPM | TEMPERATURE: 99 F | OXYGEN SATURATION: 92 % | DIASTOLIC BLOOD PRESSURE: 82 MMHG | RESPIRATION RATE: 18 BRPM | SYSTOLIC BLOOD PRESSURE: 125 MMHG

## 2020-02-05 LAB — PROCALCITONIN SERPL-MCNC: 0.39 NG/ML — HIGH (ref 0.02–0.1)

## 2020-02-05 RX ORDER — CEFPODOXIME PROXETIL 100 MG
1 TABLET ORAL
Qty: 6 | Refills: 0
Start: 2020-02-05 | End: 2020-02-07

## 2020-02-05 RX ORDER — FUROSEMIDE 40 MG
40 TABLET ORAL ONCE
Refills: 0 | Status: DISCONTINUED | OUTPATIENT
Start: 2020-02-05 | End: 2020-02-05

## 2020-02-05 RX ORDER — IPRATROPIUM/ALBUTEROL SULFATE 18-103MCG
3 AEROSOL WITH ADAPTER (GRAM) INHALATION ONCE
Refills: 0 | Status: COMPLETED | OUTPATIENT
Start: 2020-02-05 | End: 2020-02-05

## 2020-02-05 RX ORDER — FUROSEMIDE 40 MG
40 TABLET ORAL ONCE
Refills: 0 | Status: COMPLETED | OUTPATIENT
Start: 2020-02-05 | End: 2020-02-05

## 2020-02-05 RX ORDER — AZITHROMYCIN 500 MG/1
1 TABLET, FILM COATED ORAL
Qty: 3 | Refills: 0
Start: 2020-02-05 | End: 2020-02-07

## 2020-02-05 RX ADMIN — Medication 100 MILLIGRAM(S): at 17:48

## 2020-02-05 RX ADMIN — Medication 240 MILLIGRAM(S): at 05:53

## 2020-02-05 RX ADMIN — Medication 100 MILLIGRAM(S): at 11:08

## 2020-02-05 RX ADMIN — Medication 3 MILLILITER(S): at 10:59

## 2020-02-05 RX ADMIN — HEPARIN SODIUM 5000 UNIT(S): 5000 INJECTION INTRAVENOUS; SUBCUTANEOUS at 05:52

## 2020-02-05 RX ADMIN — AZITHROMYCIN 500 MILLIGRAM(S): 500 TABLET, FILM COATED ORAL at 11:17

## 2020-02-05 RX ADMIN — HEPARIN SODIUM 5000 UNIT(S): 5000 INJECTION INTRAVENOUS; SUBCUTANEOUS at 17:49

## 2020-02-05 RX ADMIN — Medication 100 MILLIGRAM(S): at 05:52

## 2020-02-05 RX ADMIN — MEMANTINE HYDROCHLORIDE 10 MILLIGRAM(S): 10 TABLET ORAL at 05:53

## 2020-02-05 RX ADMIN — Medication 0.25 MILLIGRAM(S): at 05:53

## 2020-02-05 RX ADMIN — Medication 40 MILLIGRAM(S): at 11:03

## 2020-02-05 RX ADMIN — MEMANTINE HYDROCHLORIDE 10 MILLIGRAM(S): 10 TABLET ORAL at 17:48

## 2020-02-05 NOTE — DISCHARGE NOTE PROVIDER - NSDCCPCAREPLAN_GEN_ALL_CORE_FT
PRINCIPAL DISCHARGE DIAGNOSIS  Diagnosis: Pneumonia of both lower lobes due to infectious organism  Assessment and Plan of Treatment: multifocal PNA possibly gram Neg.  discharged on  azithromycin and  vantin for 3 days., home o2. Op follow up by PMd.      SECONDARY DISCHARGE DIAGNOSES  Diagnosis: CHF, chronic  Assessment and Plan of Treatment: HFpEF

## 2020-02-05 NOTE — DISCHARGE NOTE NURSING/CASE MANAGEMENT/SOCIAL WORK - PATIENT PORTAL LINK FT
You can access the FollowMyHealth Patient Portal offered by [facility] by registering at the following website: [email or web address]. By joining [employer]’s FollowMyHealth portal, you will also be able to view your health information using other applications (apps) compatible with our system.

## 2020-02-05 NOTE — DISCHARGE NOTE PROVIDER - PROVIDER TOKENS
PROVIDER:[TOKEN:[[id number]],FOLLOWUP:[1 week]] PROVIDER:[TOKEN:[[id number]],FOLLOWUP:[1 week]],PROVIDER:[TOKEN:[[id number]],FOLLOWUP:[1-3 days]]

## 2020-02-05 NOTE — DISCHARGE NOTE PROVIDER - NSDCMRMEDTOKEN_GEN_ALL_CORE_FT
allopurinol 100 mg oral tablet: 1 tab(s) orally once a day  allopurinol 100 mg oral tablet:  orally once a day in am  ALPRAZolam 0.25 mg oral tablet:  orally once a day in am  atorvastatin 10 mg oral tablet: 1 tab(s) orally once a day (at bedtime)  atorvastatin 10 mg oral tablet: 1 tab(s) orally once a day (at bedtime)  azithromycin 500 mg oral tablet: 1 tab(s) orally once a day  Calcium 600+D oral tablet:  orally once a day in am  calcium carbonate 600 mg oral tablet, chewable: 1 tab(s) orally once a day  Cartia  mg/24 hours oral capsule, extended release: 1 cap(s) orally once a day in am    cefpodoxime 100 mg oral tablet: 1 tab(s) orally every 12 hours  cephalexin 250 mg oral tablet: 1 tab(s) orally every 8 hours. First dose to be taken at 6:30 PM today 3/24/16.  digoxin 250 mcg (0.25 mg) oral tablet: 1 tab(s) orally once a day  digoxin 250 mcg (0.25 mg) oral tablet: 1 tab(s) orally once a day in am  diltiazem 240 mg/24 hours oral tablet, extended release: 1 tab(s) orally once a day  docusate sodium 100 mg oral capsule:  orally once a day in am  ferrous sulfate: 130 milligram(s) orally once a day in am  multivitamin: 1 tab(s) orally once a day  multivitamin:   once a day in am last dose 3/15  Namzaric 28 mg-10 mg oral capsule, extended release: 1 cap(s) orally once a day in am  Namzaric 28 mg-10 mg oral capsule, extended release: 1 cap(s) orally once a day  Vitamin D3 2000 intl units oral capsule: 1 cap(s) orally once a day  Vitamin D3 2000 intl units oral capsule: 1 cap(s) orally once a day deisy  Zetia 10 mg oral tablet: 1 tab(s) orally once a day  Zetia 10 mg oral tablet:  orally once a day (at bedtime) allopurinol 100 mg oral tablet: 1 tab(s) orally once a day  ALPRAZolam 0.25 mg oral tablet:  orally once a day in am  atorvastatin 10 mg oral tablet: 1 tab(s) orally once a day (at bedtime)  azithromycin 500 mg oral tablet: 1 tab(s) orally once a day  calcium carbonate 600 mg oral tablet, chewable: 1 tab(s) orally once a day  Cartia  mg/24 hours oral capsule, extended release: 1 cap(s) orally once a day in am    cefpodoxime 100 mg oral tablet: 1 tab(s) orally every 12 hours  digoxin 250 mcg (0.25 mg) oral tablet: 1 tab(s) orally once a day in am  multivitamin: 1 tab(s) orally once a day  Namzaric 28 mg-10 mg oral capsule, extended release: 1 cap(s) orally once a day  Vitamin D3 2000 intl units oral capsule: 1 cap(s) orally once a day  Zetia 10 mg oral tablet: 1 tab(s) orally once a day

## 2020-02-05 NOTE — DISCHARGE NOTE PROVIDER - HOSPITAL COURSE
89 year olf female developed  respiratory distress at home and was admitted with CHF and PNA.  patient was  given diuretics and  IV abx, cefipime and  improved. . she was seen by her cardiologist, Dr Lundberg and Id Dr Yañez. . Her abx was switched  to PO vantin  ans azithromycin.  . She  has dementia and is non verbal.  DNR status.       she has HF pEF. .  prognosis is poor.    Cardiac pacemaker    HTN (hypertension)    Alzheimer's dementia    LOLY (iron deficiency anemia)    Gout    Anxiety    Macular degeneration    Afib: was on coumadin until early 2015, was discontinued d/t fall rsks per son ( lucila )    Head trauma: 2 subdural hematomas 5/2015    Cholecystitis    Uterine fibroid    Anemia    Infected hand    CA - breast cancer    High cholesterol    S/P cataract surgery, right: 2006, left: 2003    S/P total mastectomy, left: 2004    S/P knee replacement: 2003    S/P cholecystectomy: 2002    S/P rotator cuff repair: 2002 - left    S/P total hysterectomy: partial 1992    Gallbladder & bile duct stone 89 year olf female developed  respiratory distress at home and was admitted with CHF and PNA.  patient was  given diuretics and  IV abx, cefipime and  improved. . she was seen by her cardiologist, Dr Lundberg and Id Dr Yañez. . Her abx was switched  to PO vantin  ans azithromycin.  . She  has dementia and is non verbal.  DNR status.       she has HF pEF. .  prognosis is poor.. patient  has low sturation of oxygen on roomair 88% and  she is going home on o2 supplementation 3 litres per mtby nasal cannulla.    Cardiac pacemaker    HTN (hypertension)    Alzheimer's dementia    LOLY (iron deficiency anemia)    Gout    Anxiety    Macular degeneration    Afib: was on coumadin until early 2015, was discontinued d/t fall rsks per son ( lucila )    Head trauma: 2 subdural hematomas 5/2015    Cholecystitis    Uterine fibroid    Anemia    Infected hand    CA - breast cancer    High cholesterol    S/P cataract surgery, right: 2006, left: 2003    S/P total mastectomy, left: 2004    S/P knee replacement: 2003    S/P cholecystectomy: 2002    S/P rotator cuff repair: 2002 - left    S/P total hysterectomy: partial 1992    Gallbladder & bile duct stone

## 2020-02-05 NOTE — PROGRESS NOTE ADULT - ASSESSMENT
CHF / PNA  pacemaker status  HTN   dementia  A.fib  on broad spectrum antibiotics    leukocytosis down to normal , no fever   clinically ok, mostly sleeping , aid at bedside   now on po vantin and zithromax for 5 days

## 2020-02-05 NOTE — DISCHARGE NOTE PROVIDER - CARE PROVIDERS DIRECT ADDRESSES
,[email or web address]

## 2020-02-05 NOTE — DISCHARGE NOTE PROVIDER - CARE PROVIDER_API CALL
Jorge Luis Lundberg)  Cardiology; Cardiovascular Disease; Nuclear Cardiology  [address] [place] [address]  Phone: [phone]  Fax: [phone]  Follow Up Time: 1 week Jorge Luis Lundberg)  Cardiology; Cardiovascular Disease; Nuclear Cardiology  [address] [place] [address]  Phone: [phone]  Fax: [phone]  Follow Up Time: 1 week    Miguel Angel Cazares (DO)  Family Medicine  [address] [place] [address]  Phone: [phone]  Fax: [phone]  Follow Up Time: 1-3 days

## 2020-02-05 NOTE — PROGRESS NOTE ADULT - SUBJECTIVE AND OBJECTIVE BOX
Patient is a 89y old  Female who presents with a chief complaint of chf/ PNA, a.fib.  dementia/ elevated WBC count/Elevated Probnp. (05 Feb 2020 10:38)  no distress. patient  has low o2 sat on room air. . will need o2 supplementation at home 3l/ mt by nasal cannulla.   Vitals stable.      INTERVAL HPI/OVERNIGHT EVENTS:  PAST MEDICAL & SURGICAL HISTORY:  Cardiac pacemaker  HTN (hypertension)  Alzheimer's dementia  LOLY (iron deficiency anemia)  Gout  Anxiety  Macular degeneration  Afib: was on coumadin until early 2015, was discontinue d/t fall rsks per son ( lucila )  Head trauma: 2 subdural hematomas 5/2015  Cataract: left eye  Cholecystitis  Rotator cuff arthropathy: left shoulder  Uterine fibroid  Anemia  Infected hand  CA - breast cancer  Gout  High cholesterol  Hypertension  S/P cataract surgery, right: 2006  S/P mastectomy, left: 2004  S/P cataract surgery, left: 2003  S/P knee replacement: 2003  S/P cholecystectomy: 2002  S/P rotator cuff repair: 2002 - left  S/P hysterectomy: partial 1992  History of cholecystectomy  History of knee replacement procedure of right knee  H/O total hysterectomy  History of total mastectomy of right breast  Gallbladder & bile duct stone      MEDICATIONS  (STANDING):  allopurinol 100 milliGRAM(s) Oral daily  atorvastatin 10 milliGRAM(s) Oral at bedtime  azithromycin   Tablet 500 milliGRAM(s) Oral daily  cefpodoxime 100 milliGRAM(s) Oral every 12 hours  digoxin     Tablet 0.25 milliGRAM(s) Oral daily  diltiazem    milliGRAM(s) Oral daily  heparin  Injectable 5000 Unit(s) SubCutaneous two times a day  memantine 10 milliGRAM(s) Oral two times a day    MEDICATIONS  (PRN):  acetaminophen   Tablet .. 650 milliGRAM(s) Oral every 6 hours PRN Mild Pain (1 - 3)      Allergies    No Known Allergies    Intolerances        REVIEW OF SYSTEMS:  CONSTITUTIONAL: No fever, weight loss, or fatigue  EYES: No eye pain, visual disturbances, or discharge  ENMT:  No difficulty hearing, tinnitus, vertigo; No sinus or throat pain  NECK: No pain or stiffness  BREASTS: No pain, masses, or nipple discharge  RESPIRATORY: No cough, wheezing, chills or hemoptysis; No shortness of breath  CARDIOVASCULAR: No chest pain, palpitations, dizziness, or leg swelling  GASTROINTESTINAL: No abdominal or epigastric pain. No nausea, vomiting, or hematemesis; No diarrhea or constipation. No melena or hematochezia.  GENITOURINARY: No dysuria, frequency, hematuria, or incontinence  NEUROLOGICAL: No headaches, memory loss, loss of strength, numbness, or tremors  SKIN: No itching, burning, rashes, or lesions   LYMPH NODES: No enlarged glands  ENDOCRINE: No heat or cold intolerance; No hair loss  MUSCULOSKELETAL: No joint pain or swelling; No muscle, back, or extremity pain  PSYCHIATRIC: No depression, anxiety, mood swings, or difficulty sleeping  HEME/LYMPH: No easy bruising, or bleeding gums  ALLERY AND IMMUNOLOGIC: No hives or eczema    Vital Signs Last 24 Hrs  T(C): 37.3 (05 Feb 2020 05:11), Max: 37.3 (05 Feb 2020 05:11)  T(F): 99.1 (05 Feb 2020 05:11), Max: 99.1 (05 Feb 2020 05:11)  HR: 70 (05 Feb 2020 11:03) (70 - 84)  BP: 177/79 (05 Feb 2020 05:11) (152/77 - 181/87)  BP(mean): --  RR: 20 (05 Feb 2020 11:31) (20 - 24)  SpO2: 87% (05 Feb 2020 11:31) (87% - 96%)    PHYSICAL EXAM:  GENERAL: NAD, well-groomed, well-developed  HEAD:  Atraumatic, Normocephalic  EYES: EOMI, PERRLA, conjunctiva and sclera clear  ENMT: No tonsillar erythema, exudates, or enlargement; Moist mucous membranes, Good dentition, No lesions  NECK: Supple, No JVD, Normal thyroid  NERVOUS SYSTEM:  Alert & Oriented X3, Good concentration; Motor Strength 5/5 B/L upper and lower extremities; DTRs 2+ intact and symmetric  CHEST/LUNG: Clear to percussion bilaterally; No rales, rhonchi, wheezing, or rubs  HEART: Regular rate and rhythm; No murmurs, rubs, or gallops  ABDOMEN: Soft, Nontender, Nondistended; Bowel sounds present  EXTREMITIES:  2+ Peripheral Pulses, No clubbing, cyanosis, or edema  LYMPH: No lymphadenopathy noted  SKIN: No rashes or lesions    LABS:                        11.4   11.46 )-----------( 318      ( 04 Feb 2020 07:05 )             35.7     02-04    137  |  101  |  37<H>  ----------------------------<  107<H>  4.2   |  27  |  0.81    Ca    8.5      04 Feb 2020 07:05            CAPILLARY BLOOD GLUCOSE                    RADIOLOGY & ADDITIONAL TESTS:    Imaging Personally Reviewed:  [ ] YES  [ ] NO    Consultant(s) Notes Reviewed:  [ ] YES  [ ] NO    Care Discussed with Consultants/Other Providers [x ] YES  [ ] NO    Care discussed with family,         [  ]   yes  [  ]  No    imp:    stable[x ]    unstable[  ]     improving [   ]       unchanged  [  ]                Plans:  Continue present plans  [ x ] discharge home with o2  by [facility]  3l/mt               New consult [  ]   specialty  .......               order test[  ]    test name.                  Discharge Planning  [  ]

## 2020-02-05 NOTE — PROGRESS NOTE ADULT - SUBJECTIVE AND OBJECTIVE BOX
HPI:  Pt is a 88 yo lady with a pmhx of HTN, HL, Afib on pacemaker, dementia who presents to the ED with cough. Has had 10 days of cough. Went to urgent care today and O2 sat was 89 and sent to ED. No chest pain, no fevers at home. No abdominal pain. At baseline mental status, nonverbal.   in the Ed patient has abnormal chest xray showing chf/ PNA. . O2 sat is 98 on o2. (01 Feb 2020 20:26)      Allergies    No Known Allergies    Intolerances        MEDICATIONS  (STANDING):  allopurinol 100 milliGRAM(s) Oral daily  atorvastatin 10 milliGRAM(s) Oral at bedtime  azithromycin   Tablet 500 milliGRAM(s) Oral daily  cefpodoxime 100 milliGRAM(s) Oral every 12 hours  digoxin     Tablet 0.25 milliGRAM(s) Oral daily  diltiazem    milliGRAM(s) Oral daily  heparin  Injectable 5000 Unit(s) SubCutaneous two times a day  memantine 10 milliGRAM(s) Oral two times a day    MEDICATIONS  (PRN):  acetaminophen   Tablet .. 650 milliGRAM(s) Oral every 6 hours PRN Mild Pain (1 - 3)      REVIEW OF SYSTEMS:  sleeping and demented , poor historian     VITAL SIGNS:  T(C): 37.3 (02-05-20 @ 05:11), Max: 37.3 (02-05-20 @ 05:11)  T(F): 99.1 (02-05-20 @ 05:11), Max: 99.1 (02-05-20 @ 05:11)  HR: 81 (02-05-20 @ 05:11) (71 - 88)  BP: 177/79 (02-05-20 @ 05:11) (145/66 - 181/87)  RR: 20 (02-05-20 @ 05:11) (20 - 24)  SpO2: 91% (02-05-20 @ 05:11) (91% - 96%)  Wt(kg): --    PHYSICAL EXAM:    GENERAL: not in any distress, sleeping on NC   HEENT: Neck is supple, normocephalic, atraumatic   CHEST/LUNG: Clear  HEART: Regular rate and rhythm; No murmurs, rubs, or gallops  ABDOMEN: Soft, Nontender, Nondistended  EXTREMITIES:  2+ Peripheral Pulses  GENITOURINARY:   SKIN: No rashes or lesions  BACK: no pressor sore   NERVOUS SYSTEM: sleeping and demented     LABS:                         11.4   11.46 )-----------( 318      ( 04 Feb 2020 07:05 )             35.7     02-04    137  |  101  |  37<H>  ----------------------------<  107<H>  4.2   |  27  |  0.81    Ca    8.5      04 Feb 2020 07:05                                Culture Results:   No growth to date. (02-02 @ 01:48)  Culture Results:   No growth to date. (02-02 @ 01:48)  Culture Results:   No growth (02-02 @ 01:32)                Radiology:

## 2020-02-05 NOTE — PROGRESS NOTE ADULT - REASON FOR ADMISSION
chf/ PNA, a.fib.  dementia/ elevated WBC count/Elevated Probnp.

## 2020-02-06 ENCOUNTER — APPOINTMENT (OUTPATIENT)
Dept: CARE COORDINATION | Facility: HOME HEALTH | Age: 85
End: 2020-02-06
Payer: MEDICARE

## 2020-02-06 VITALS
RESPIRATION RATE: 18 BRPM | SYSTOLIC BLOOD PRESSURE: 158 MMHG | TEMPERATURE: 97.2 F | DIASTOLIC BLOOD PRESSURE: 76 MMHG | HEART RATE: 70 BPM | OXYGEN SATURATION: 92 %

## 2020-02-06 DIAGNOSIS — I48.91 UNSPECIFIED ATRIAL FIBRILLATION: ICD-10-CM

## 2020-02-06 DIAGNOSIS — J18.9 PNEUMONIA, UNSPECIFIED ORGANISM: ICD-10-CM

## 2020-02-06 DIAGNOSIS — F03.91 UNSPECIFIED DEMENTIA WITH BEHAVIORAL DISTURBANCE: ICD-10-CM

## 2020-02-06 DIAGNOSIS — I10 ESSENTIAL (PRIMARY) HYPERTENSION: ICD-10-CM

## 2020-02-06 DIAGNOSIS — I50.9 HEART FAILURE, UNSPECIFIED: ICD-10-CM

## 2020-02-06 PROBLEM — Z95.0 PRESENCE OF CARDIAC PACEMAKER: Chronic | Status: ACTIVE | Noted: 2020-02-01

## 2020-02-06 PROCEDURE — 99496 TRANSJ CARE MGMT HIGH F2F 7D: CPT

## 2020-02-06 RX ORDER — RISPERIDONE 0.25 MG/1
0.25 TABLET, FILM COATED ORAL
Qty: 90 | Refills: 2 | Status: ACTIVE | COMMUNITY
Start: 2016-12-07

## 2020-02-06 RX ORDER — DOCUSATE SODIUM 100 MG/1
100 CAPSULE ORAL DAILY
Refills: 0 | Status: ACTIVE | COMMUNITY

## 2020-02-06 RX ORDER — DIGOXIN 125 UG/1
125 TABLET ORAL
Qty: 90 | Refills: 2 | Status: ACTIVE | COMMUNITY
Start: 2017-08-01

## 2020-02-06 RX ORDER — CEFPODOXIME PROXETIL 100 MG/1
100 TABLET, FILM COATED ORAL
Refills: 0 | Status: ACTIVE | COMMUNITY
Start: 2020-02-06

## 2020-02-06 RX ORDER — AZITHROMYCIN 500 MG/1
500 TABLET, FILM COATED ORAL
Refills: 0 | Status: ACTIVE | COMMUNITY
Start: 2020-02-06

## 2020-02-06 NOTE — HEALTH RISK ASSESSMENT
[No] : No [(PHQ-2) Unable to screen] : PHQ-2: unable to screen [UnableToScreenReason] : dx Dementia [] : No

## 2020-02-06 NOTE — PHYSICAL EXAM
[No Acute Distress] : no acute distress [Normal Outer Ear/Nose] : the outer ears and nose were normal in appearance [No JVD] : no jugular venous distention [Supple] : supple [Decreased Breath Sounds] : breath sounds were decreased diffusely [No Accessory Muscle Use] : no accessory muscle use [No Respiratory Distress] : no respiratory distress  [Normal Rate] : normal rate  [Regularly Irregular] : regularly irregular [No Carotid Bruits] : no carotid bruits [Pedal Pulses Present] : the pedal pulses are present [No Edema] : there was no peripheral edema [No Extremity Clubbing/Cyanosis] : no extremity clubbing/cyanosis [Soft] : abdomen soft [Non Tender] : non-tender [Non-distended] : non-distended [Normal Bowel Sounds] : normal bowel sounds [No CVA Tenderness] : no CVA  tenderness [No Spinal Tenderness] : no spinal tenderness [Normal Affect] : the affect was normal [Grossly Normal Strength/Tone] : grossly normal strength/tone [de-identified] : on continuous 02 [de-identified] : hx of atrial fib

## 2020-02-06 NOTE — ASSESSMENT
[FreeTextEntry1] : CM Assessment (8P) s \par 1. Principle Diagnosis: PNA\par 2. Poly-pharmacy/Med Adherence: Multiple medications\par 3. Psychological Screen (PHQ2): unable to assess\par 4. Physical Limitations/Pain: Safety precautions reinforced with aide\par 5. Health Literacy: patient confused\par 6. Patient Support: lives son and have 24 hours private aide\par 7. Palliative Care: negative\par 8. Prior Hospitalization: negative\par Overall Risk:  High; 8 P Risk Score = 5/8  \par \par CARE PLAN\par PNA\par Goal: To improve SOB and prevent reinfection\par Goal Type: Medical\par Start Date: 2/6/2020\par Status: In progress\par Intervention: Reinforced importance of medication compliance\par Intervention: Follow up with pulmonologist outpatient\par Intervention: Safety precaution reinforced\par Responsible party: son (Kimani)\par Target Date 3/6/2020\par \par

## 2020-02-06 NOTE — PLAN
[FreeTextEntry1] : Pt's son was informed about CN’s role/ TCM program and overview of transitional care reviewed with patient. Son educated on topics of importance such as compliance with all provider visits, prescribed medication regimen, and low salt / heart healthy diet. Pt encouraged calling CN with any issues, concerns or questions, also educated to notify CN if experiencing CP, SOB , cough, increased mucus/phlegm production, abdominal discomfort/swelling, difficulty sleeping or lying flat, fever, chills, fatigue, weight gain of 2-3lbs in 24 hours or 5lbs in one week, dizziness, lightheadedness, n/v/d/c, swelling to extremities and/or any c/o or concerns. Reassurance provided. Will continue to monitor.

## 2020-02-06 NOTE — REVIEW OF SYSTEMS
[Memory Loss] : memory loss [Negative] : Psychiatric [Fever] : no fever [Chills] : no chills [Discharge] : no discharge [Redness] : no redness [Vision Problems] : no vision problems [Earache] : no earache [Nasal Discharge] : no nasal discharge [Sore Throat] : no sore throat [Chest Pain] : no chest pain [Lower Ext Edema] : no lower extremity edema [Palpitations] : no palpitations [Shortness Of Breath] : no shortness of breath [Wheezing] : no wheezing [Cough] : no cough [Dyspnea on Exertion] : no dyspnea on exertion [Abdominal Pain] : no abdominal pain [Dysuria] : no dysuria [Hematuria] : no hematuria [Frequency] : no frequency [de-identified] : hx of dementia

## 2020-02-06 NOTE — HISTORY OF PRESENT ILLNESS
[Post-hospitalization from ___ Hospital] : Post-hospitalization from [unfilled] Hospital [Discharged on ___] : discharged on [unfilled] [Admitted on: ___] : The patient was admitted on [unfilled] [Med Reconciliation] : medication reconciliation has been completed [Discharge Summary] : discharge summary [Discharge Med List] : discharge medication list [Patient Contacted By: ____] : and contacted by [unfilled] [FreeTextEntry3] : CC: S/P hospitalization for PNA and CHF. TCM home visit [FreeTextEntry2] : reviewed and edited from [facility] discharge note: This is an 89 year old female developed respiratory distress at home and was admitted with CHF and PNA. Patient was given diuretics and IV abx, cefipime and improved. Patient was seen by her cardiologist, Dr Lundberg and Id Dr Yañez. . Her abx was switched to PO vantin ans azithromycin. Patient has HF pEF. prognosis is poor. Patient has low saturation of oxygen on room air 88% d/c home on  oxygen 3 liters via nasal cannula.\par TCM home visit today. Patient and private aide present, patient resting in NAD. Cough improving.  No reports of fever/chills, chest pain, SOB, abd pain, and pleuritic chest pain. Patient PCP is Dr. Cazares and Cardiologist Dr. Lundberg. CN called and spoke with Dr. Cazares. As per PCP patient had no recent visit. Son Kimani will call to schedule f/u appt with PCP and Dr. Lundberg.

## 2020-02-06 NOTE — CURRENT MEDS
[Takes medication as prescribed] : takes [None] : Patient does not have any barriers to medication adherence [FreeTextEntry1] : Vasile Harman to pick-up antibiotics from the pharmacy today

## 2020-02-07 ENCOUNTER — INPATIENT (INPATIENT)
Facility: HOSPITAL | Age: 85
LOS: 6 days | Discharge: HOME HEALTH | End: 2020-02-14
Attending: INTERNAL MEDICINE | Admitting: INTERNAL MEDICINE
Payer: MEDICARE

## 2020-02-07 VITALS
WEIGHT: 110.01 LBS | TEMPERATURE: 98 F | DIASTOLIC BLOOD PRESSURE: 85 MMHG | HEIGHT: 60 IN | SYSTOLIC BLOOD PRESSURE: 174 MMHG | HEART RATE: 70 BPM | OXYGEN SATURATION: 95 % | RESPIRATION RATE: 22 BRPM

## 2020-02-07 DIAGNOSIS — Z90.49 ACQUIRED ABSENCE OF OTHER SPECIFIED PARTS OF DIGESTIVE TRACT: Chronic | ICD-10-CM

## 2020-02-07 DIAGNOSIS — Z96.651 PRESENCE OF RIGHT ARTIFICIAL KNEE JOINT: Chronic | ICD-10-CM

## 2020-02-07 DIAGNOSIS — Z98.89 OTHER SPECIFIED POSTPROCEDURAL STATES: Chronic | ICD-10-CM

## 2020-02-07 DIAGNOSIS — Z96.659 PRESENCE OF UNSPECIFIED ARTIFICIAL KNEE JOINT: Chronic | ICD-10-CM

## 2020-02-07 DIAGNOSIS — Z98.41 CATARACT EXTRACTION STATUS, RIGHT EYE: Chronic | ICD-10-CM

## 2020-02-07 DIAGNOSIS — Z90.710 ACQUIRED ABSENCE OF BOTH CERVIX AND UTERUS: Chronic | ICD-10-CM

## 2020-02-07 DIAGNOSIS — Z90.12 ACQUIRED ABSENCE OF LEFT BREAST AND NIPPLE: Chronic | ICD-10-CM

## 2020-02-07 DIAGNOSIS — Z98.42 CATARACT EXTRACTION STATUS, LEFT EYE: Chronic | ICD-10-CM

## 2020-02-07 LAB
ALBUMIN SERPL ELPH-MCNC: 2.1 G/DL — LOW (ref 3.3–5)
ALP SERPL-CCNC: 88 U/L — SIGNIFICANT CHANGE UP (ref 40–120)
ALT FLD-CCNC: 24 U/L — SIGNIFICANT CHANGE UP (ref 12–78)
ANION GAP SERPL CALC-SCNC: 9 MMOL/L — SIGNIFICANT CHANGE UP (ref 5–17)
APPEARANCE UR: CLEAR — SIGNIFICANT CHANGE UP
APTT BLD: 27.5 SEC — SIGNIFICANT CHANGE UP (ref 27.5–36.3)
AST SERPL-CCNC: 34 U/L — SIGNIFICANT CHANGE UP (ref 15–37)
BACTERIA # UR AUTO: ABNORMAL
BASE EXCESS BLDA CALC-SCNC: 6 MMOL/L — HIGH (ref -2–2)
BASOPHILS # BLD AUTO: 0.03 K/UL — SIGNIFICANT CHANGE UP (ref 0–0.2)
BASOPHILS NFR BLD AUTO: 0.2 % — SIGNIFICANT CHANGE UP (ref 0–2)
BILIRUB SERPL-MCNC: 0.4 MG/DL — SIGNIFICANT CHANGE UP (ref 0.2–1.2)
BILIRUB UR-MCNC: NEGATIVE — SIGNIFICANT CHANGE UP
BLOOD GAS COMMENTS: SIGNIFICANT CHANGE UP
BLOOD GAS COMMENTS: SIGNIFICANT CHANGE UP
BLOOD GAS SOURCE: SIGNIFICANT CHANGE UP
BUN SERPL-MCNC: 50 MG/DL — HIGH (ref 7–23)
CALCIUM SERPL-MCNC: 8.7 MG/DL — SIGNIFICANT CHANGE UP (ref 8.5–10.1)
CHLORIDE SERPL-SCNC: 100 MMOL/L — SIGNIFICANT CHANGE UP (ref 96–108)
CO2 SERPL-SCNC: 25 MMOL/L — SIGNIFICANT CHANGE UP (ref 22–31)
COLOR SPEC: YELLOW — SIGNIFICANT CHANGE UP
COMMENT - URINE: SIGNIFICANT CHANGE UP
CREAT SERPL-MCNC: 0.95 MG/DL — SIGNIFICANT CHANGE UP (ref 0.5–1.3)
CULTURE RESULTS: SIGNIFICANT CHANGE UP
CULTURE RESULTS: SIGNIFICANT CHANGE UP
DIFF PNL FLD: NEGATIVE — SIGNIFICANT CHANGE UP
EOSINOPHIL # BLD AUTO: 0.19 K/UL — SIGNIFICANT CHANGE UP (ref 0–0.5)
EOSINOPHIL NFR BLD AUTO: 1.2 % — SIGNIFICANT CHANGE UP (ref 0–6)
EPI CELLS # UR: SIGNIFICANT CHANGE UP
GLUCOSE SERPL-MCNC: 135 MG/DL — HIGH (ref 70–99)
GLUCOSE UR QL: NEGATIVE MG/DL — SIGNIFICANT CHANGE UP
GRAN CASTS # UR COMP ASSIST: ABNORMAL /LPF
HCO3 BLDA-SCNC: 28 MMOL/L — SIGNIFICANT CHANGE UP (ref 21–29)
HCT VFR BLD CALC: 38.3 % — SIGNIFICANT CHANGE UP (ref 34.5–45)
HGB BLD-MCNC: 12.1 G/DL — SIGNIFICANT CHANGE UP (ref 11.5–15.5)
HOROWITZ INDEX BLDA+IHG-RTO: 60 — SIGNIFICANT CHANGE UP
IMM GRANULOCYTES NFR BLD AUTO: 0.6 % — SIGNIFICANT CHANGE UP (ref 0–1.5)
INR BLD: 1.02 RATIO — SIGNIFICANT CHANGE UP (ref 0.88–1.16)
KETONES UR-MCNC: NEGATIVE — SIGNIFICANT CHANGE UP
LACTATE SERPL-SCNC: 1.6 MMOL/L — SIGNIFICANT CHANGE UP (ref 0.7–2)
LEUKOCYTE ESTERASE UR-ACNC: ABNORMAL
LYMPHOCYTES # BLD AUTO: 1.57 K/UL — SIGNIFICANT CHANGE UP (ref 1–3.3)
LYMPHOCYTES # BLD AUTO: 9.9 % — LOW (ref 13–44)
MCHC RBC-ENTMCNC: 29.4 PG — SIGNIFICANT CHANGE UP (ref 27–34)
MCHC RBC-ENTMCNC: 31.6 GM/DL — LOW (ref 32–36)
MCV RBC AUTO: 93.2 FL — SIGNIFICANT CHANGE UP (ref 80–100)
MONOCYTES # BLD AUTO: 1.2 K/UL — HIGH (ref 0–0.9)
MONOCYTES NFR BLD AUTO: 7.5 % — SIGNIFICANT CHANGE UP (ref 2–14)
NEUTROPHILS # BLD AUTO: 12.85 K/UL — HIGH (ref 1.8–7.4)
NEUTROPHILS NFR BLD AUTO: 80.6 % — HIGH (ref 43–77)
NITRITE UR-MCNC: NEGATIVE — SIGNIFICANT CHANGE UP
NRBC # BLD: 0 /100 WBCS — SIGNIFICANT CHANGE UP (ref 0–0)
NT-PROBNP SERPL-SCNC: HIGH PG/ML (ref 0–450)
PCO2 BLDA: 32 MMHG — SIGNIFICANT CHANGE UP (ref 32–46)
PH BLD: 7.55 — HIGH (ref 7.35–7.45)
PH UR: 5 — SIGNIFICANT CHANGE UP (ref 5–8)
PLATELET # BLD AUTO: 333 K/UL — SIGNIFICANT CHANGE UP (ref 150–400)
PO2 BLDA: 85 MMHG — SIGNIFICANT CHANGE UP (ref 74–108)
POTASSIUM SERPL-MCNC: 5 MMOL/L — SIGNIFICANT CHANGE UP (ref 3.5–5.3)
POTASSIUM SERPL-SCNC: 5 MMOL/L — SIGNIFICANT CHANGE UP (ref 3.5–5.3)
PROT SERPL-MCNC: 7.5 GM/DL — SIGNIFICANT CHANGE UP (ref 6–8.3)
PROT UR-MCNC: 30 MG/DL
PROTHROM AB SERPL-ACNC: 11.4 SEC — SIGNIFICANT CHANGE UP (ref 10–12.9)
RBC # BLD: 4.11 M/UL — SIGNIFICANT CHANGE UP (ref 3.8–5.2)
RBC # FLD: 14.8 % — HIGH (ref 10.3–14.5)
SAO2 % BLDA: 97 % — HIGH (ref 92–96)
SODIUM SERPL-SCNC: 134 MMOL/L — LOW (ref 135–145)
SP GR SPEC: 1.01 — SIGNIFICANT CHANGE UP (ref 1.01–1.02)
SPECIMEN SOURCE: SIGNIFICANT CHANGE UP
SPECIMEN SOURCE: SIGNIFICANT CHANGE UP
TROPONIN I SERPL-MCNC: 2.11 NG/ML — HIGH (ref 0.01–0.04)
UROBILINOGEN FLD QL: NEGATIVE MG/DL — SIGNIFICANT CHANGE UP
WBC # BLD: 15.93 K/UL — HIGH (ref 3.8–10.5)
WBC # FLD AUTO: 15.93 K/UL — HIGH (ref 3.8–10.5)
WBC UR QL: SIGNIFICANT CHANGE UP

## 2020-02-07 PROCEDURE — 99291 CRITICAL CARE FIRST HOUR: CPT

## 2020-02-07 PROCEDURE — 93010 ELECTROCARDIOGRAM REPORT: CPT

## 2020-02-07 PROCEDURE — 71045 X-RAY EXAM CHEST 1 VIEW: CPT | Mod: 26

## 2020-02-07 RX ORDER — HEPARIN SODIUM 5000 [USP'U]/ML
2900 INJECTION INTRAVENOUS; SUBCUTANEOUS EVERY 6 HOURS
Refills: 0 | Status: DISCONTINUED | OUTPATIENT
Start: 2020-02-07 | End: 2020-02-07

## 2020-02-07 RX ORDER — HEPARIN SODIUM 5000 [USP'U]/ML
2900 INJECTION INTRAVENOUS; SUBCUTANEOUS ONCE
Refills: 0 | Status: DISCONTINUED | OUTPATIENT
Start: 2020-02-07 | End: 2020-02-07

## 2020-02-07 RX ORDER — HEPARIN SODIUM 5000 [USP'U]/ML
INJECTION INTRAVENOUS; SUBCUTANEOUS
Qty: 25000 | Refills: 0 | Status: DISCONTINUED | OUTPATIENT
Start: 2020-02-07 | End: 2020-02-07

## 2020-02-07 RX ORDER — NITROGLYCERIN 6.5 MG
0.4 CAPSULE, EXTENDED RELEASE ORAL ONCE
Refills: 0 | Status: COMPLETED | OUTPATIENT
Start: 2020-02-07 | End: 2020-02-07

## 2020-02-07 RX ORDER — FUROSEMIDE 40 MG
40 TABLET ORAL ONCE
Refills: 0 | Status: COMPLETED | OUTPATIENT
Start: 2020-02-07 | End: 2020-02-07

## 2020-02-07 RX ADMIN — Medication 0.4 MILLIGRAM(S): at 23:10

## 2020-02-07 RX ADMIN — Medication 40 MILLIGRAM(S): at 22:32

## 2020-02-07 NOTE — ED PROVIDER NOTE - OBJECTIVE STATEMENT
Pertinent PMH/PSH/FHx/SHx and Review of Systems contained within:     88 y/o F hx of dementia (nonverbal) CHF, HTN , BPM presents with acute onset SOB since 8pm. Pt admitted recently and discharged x2 days for CHF and pneumonia and noticed hypoxic.    ROS unobtainable due to dementia Pertinent PMH/PSH/FHx/SHx and Review of Systems contained within:     88 y/o F hx of dementia (nonverbal) CHF, HTN , PPM presents with acute onset SOB since 8pm. Pt admitted recently and discharged x2 days for CHF and pneumonia and pt was dc on O2. Pt is DNR. cardio: fefer    ROS unobtainable due to dementia

## 2020-02-07 NOTE — ED ADULT NURSE NOTE - OBJECTIVE STATEMENT
Pt brought in by son, pt altered mental status, difficulty breathing in the home. Pt hx of dementia and disoriented, presents with labored breathing and does not answer questions.

## 2020-02-07 NOTE — ED CLERICAL - NS ED CLERK NOTE PRE-ARRIVAL INFORMATION; ADDITIONAL PRE-ARRIVAL INFORMATION
This patient is enrolled in the [facility] pneumonia readmission reduction initiative and has active care navigation. This patient can be followed up by the care navigation team within 24 hours. To arrange close follow-up or to obtain additional clinical information about this patient, please call the care navigation contact number above.     Please page the Admitting Hospitalist as listed or the covering Hospitalist at [phone] for input and/or consultation PRIOR to admission. If the patient was seen by one of our pulmonologists on the last admission please contact that pulmonologist for input and/or consultation PRIOR to admission.

## 2020-02-07 NOTE — ED ADULT NURSE NOTE - PMH
Afib  was on coumadin until early 2015, was discontinue d/t fall rsks per son ( lucila )  Alzheimer's dementia    Anemia    Anxiety    CA - breast cancer    Cardiac pacemaker    Cataract  left eye  Cholecystitis    Gout    Gout    Head trauma  2 subdural hematomas 5/2015  High cholesterol    HTN (hypertension)    Hypertension    LOLY (iron deficiency anemia)    Infected hand    Macular degeneration    Rotator cuff arthropathy  left shoulder  Uterine fibroid

## 2020-02-07 NOTE — ED PROVIDER NOTE - CRITICAL CARE PROVIDED
interpretation of diagnostic studies/consultation with other physicians/direct patient care (not related to procedure)/documentation/additional history taking/conducted a detailed discussion of DNR status

## 2020-02-07 NOTE — ED ADULT TRIAGE NOTE - CHIEF COMPLAINT QUOTE
Patient BIBA: Patient diagnosed with pneumonia within past 2 weeks. Released from hospital on Wednesday: Patient reports hard to breath. Son reports Breathing difficulties started at 6pm , per son oxygenator not working, patient dependant on oxygen 2lpm. No retractions.

## 2020-02-07 NOTE — ED PROVIDER NOTE - PHYSICAL EXAMINATION
Gen: Alert, tachypneic, + accessory muscle use  Head: NC, AT, PERRL, normal lids/conjunctiva   ENT: Bilateral TM WNL, patent oropharynx without erythema/exudate, uvula midline  Neck: supple, no tenderness/meningismus  Pulm: diffuse rales  CV: RRR, no M/R/G, +dist pulses   Abd: soft, NT/ND, +BS, no guarding/rebound tenderness  Mskel: +pitting edema, no erythema/cyanosis   Skin: no rash, no bruising  Neuro: Awake, no sensory/motor deficits, CN 2-12 intact

## 2020-02-07 NOTE — ED PROVIDER NOTE - CLINICAL SUMMARY MEDICAL DECISION MAKING FREE TEXT BOX
pt more comfortable on bipap. shante sutton, reports not for heparin at this time and for conservative management. dw/ dr conley for admission.

## 2020-02-08 DIAGNOSIS — F03.90 UNSPECIFIED DEMENTIA WITHOUT BEHAVIORAL DISTURBANCE: ICD-10-CM

## 2020-02-08 DIAGNOSIS — E78.5 HYPERLIPIDEMIA, UNSPECIFIED: ICD-10-CM

## 2020-02-08 DIAGNOSIS — I21.4 NON-ST ELEVATION (NSTEMI) MYOCARDIAL INFARCTION: ICD-10-CM

## 2020-02-08 DIAGNOSIS — F41.9 ANXIETY DISORDER, UNSPECIFIED: ICD-10-CM

## 2020-02-08 DIAGNOSIS — I50.43 ACUTE ON CHRONIC COMBINED SYSTOLIC (CONGESTIVE) AND DIASTOLIC (CONGESTIVE) HEART FAILURE: ICD-10-CM

## 2020-02-08 DIAGNOSIS — I48.91 UNSPECIFIED ATRIAL FIBRILLATION: ICD-10-CM

## 2020-02-08 LAB
BASE EXCESS BLDA CALC-SCNC: 7 MMOL/L — HIGH (ref -2–2)
BLD GP AB SCN SERPL QL: SIGNIFICANT CHANGE UP
BLOOD GAS COMMENTS: SIGNIFICANT CHANGE UP
BLOOD GAS COMMENTS: SIGNIFICANT CHANGE UP
BLOOD GAS SOURCE: SIGNIFICANT CHANGE UP
DIGOXIN SERPL-MCNC: 2.43 NG/ML — HIGH (ref 0.8–2)
HCO3 BLDA-SCNC: 29 MMOL/L — SIGNIFICANT CHANGE UP (ref 21–29)
HOROWITZ INDEX BLDA+IHG-RTO: 60 — SIGNIFICANT CHANGE UP
PCO2 BLDA: 32 MMHG — SIGNIFICANT CHANGE UP (ref 32–46)
PH BLD: 7.57 — HIGH (ref 7.35–7.45)
PO2 BLDA: 82 MMHG — SIGNIFICANT CHANGE UP (ref 74–108)
PROCALCITONIN SERPL-MCNC: 0.56 NG/ML — HIGH (ref 0.02–0.1)
SAO2 % BLDA: 97 % — HIGH (ref 92–96)
TROPONIN I SERPL-MCNC: 1.85 NG/ML — HIGH (ref 0.01–0.04)

## 2020-02-08 PROCEDURE — 99223 1ST HOSP IP/OBS HIGH 75: CPT

## 2020-02-08 PROCEDURE — 99222 1ST HOSP IP/OBS MODERATE 55: CPT

## 2020-02-08 RX ORDER — FUROSEMIDE 40 MG
40 TABLET ORAL ONCE
Refills: 0 | Status: COMPLETED | OUTPATIENT
Start: 2020-02-08 | End: 2020-02-08

## 2020-02-08 RX ORDER — FUROSEMIDE 40 MG
40 TABLET ORAL
Refills: 0 | Status: DISCONTINUED | OUTPATIENT
Start: 2020-02-08 | End: 2020-02-14

## 2020-02-08 RX ORDER — ATORVASTATIN CALCIUM 80 MG/1
10 TABLET, FILM COATED ORAL AT BEDTIME
Refills: 0 | Status: DISCONTINUED | OUTPATIENT
Start: 2020-02-08 | End: 2020-02-14

## 2020-02-08 RX ORDER — MEMANTINE HYDROCHLORIDE 10 MG/1
10 TABLET ORAL DAILY
Refills: 0 | Status: DISCONTINUED | OUTPATIENT
Start: 2020-02-08 | End: 2020-02-14

## 2020-02-08 RX ORDER — FUROSEMIDE 40 MG
40 TABLET ORAL ONCE
Refills: 0 | Status: DISCONTINUED | OUTPATIENT
Start: 2020-02-08 | End: 2020-02-08

## 2020-02-08 RX ORDER — ASPIRIN/CALCIUM CARB/MAGNESIUM 324 MG
300 TABLET ORAL ONCE
Refills: 0 | Status: COMPLETED | OUTPATIENT
Start: 2020-02-08 | End: 2020-02-08

## 2020-02-08 RX ORDER — DILTIAZEM HCL 120 MG
240 CAPSULE, EXT RELEASE 24 HR ORAL DAILY
Refills: 0 | Status: DISCONTINUED | OUTPATIENT
Start: 2020-02-08 | End: 2020-02-14

## 2020-02-08 RX ORDER — VANCOMYCIN HCL 1 G
1000 VIAL (EA) INTRAVENOUS ONCE
Refills: 0 | Status: COMPLETED | OUTPATIENT
Start: 2020-02-08 | End: 2020-02-08

## 2020-02-08 RX ORDER — HYDRALAZINE HCL 50 MG
10 TABLET ORAL ONCE
Refills: 0 | Status: COMPLETED | OUTPATIENT
Start: 2020-02-08 | End: 2020-02-08

## 2020-02-08 RX ORDER — PIPERACILLIN AND TAZOBACTAM 4; .5 G/20ML; G/20ML
3.38 INJECTION, POWDER, LYOPHILIZED, FOR SOLUTION INTRAVENOUS EVERY 8 HOURS
Refills: 0 | Status: DISCONTINUED | OUTPATIENT
Start: 2020-02-08 | End: 2020-02-12

## 2020-02-08 RX ORDER — DONEPEZIL HYDROCHLORIDE 10 MG/1
10 TABLET, FILM COATED ORAL AT BEDTIME
Refills: 0 | Status: DISCONTINUED | OUTPATIENT
Start: 2020-02-08 | End: 2020-02-14

## 2020-02-08 RX ORDER — PIPERACILLIN AND TAZOBACTAM 4; .5 G/20ML; G/20ML
3.38 INJECTION, POWDER, LYOPHILIZED, FOR SOLUTION INTRAVENOUS ONCE
Refills: 0 | Status: COMPLETED | OUTPATIENT
Start: 2020-02-08 | End: 2020-02-08

## 2020-02-08 RX ORDER — DIGOXIN 250 MCG
0.25 TABLET ORAL DAILY
Refills: 0 | Status: DISCONTINUED | OUTPATIENT
Start: 2020-02-08 | End: 2020-02-09

## 2020-02-08 RX ORDER — ENOXAPARIN SODIUM 100 MG/ML
40 INJECTION SUBCUTANEOUS DAILY
Refills: 0 | Status: DISCONTINUED | OUTPATIENT
Start: 2020-02-08 | End: 2020-02-14

## 2020-02-08 RX ADMIN — ENOXAPARIN SODIUM 40 MILLIGRAM(S): 100 INJECTION SUBCUTANEOUS at 11:31

## 2020-02-08 RX ADMIN — Medication 40 MILLIGRAM(S): at 17:04

## 2020-02-08 RX ADMIN — Medication 250 MILLIGRAM(S): at 06:44

## 2020-02-08 RX ADMIN — Medication 40 MILLIGRAM(S): at 09:23

## 2020-02-08 RX ADMIN — PIPERACILLIN AND TAZOBACTAM 25 GRAM(S): 4; .5 INJECTION, POWDER, LYOPHILIZED, FOR SOLUTION INTRAVENOUS at 13:52

## 2020-02-08 RX ADMIN — Medication 0.25 MILLIGRAM(S): at 06:11

## 2020-02-08 RX ADMIN — Medication 40 MILLIGRAM(S): at 02:32

## 2020-02-08 RX ADMIN — Medication 300 MILLIGRAM(S): at 03:52

## 2020-02-08 RX ADMIN — PIPERACILLIN AND TAZOBACTAM 200 GRAM(S): 4; .5 INJECTION, POWDER, LYOPHILIZED, FOR SOLUTION INTRAVENOUS at 02:31

## 2020-02-08 RX ADMIN — PIPERACILLIN AND TAZOBACTAM 25 GRAM(S): 4; .5 INJECTION, POWDER, LYOPHILIZED, FOR SOLUTION INTRAVENOUS at 21:25

## 2020-02-08 RX ADMIN — Medication 240 MILLIGRAM(S): at 06:12

## 2020-02-08 NOTE — H&P ADULT - NSHPLABSRESULTS_GEN_ALL_CORE
Recent Vitals  T(C): 36.7 (20 @ 20:15), Max: 36.7 (20 @ 20:15)  HR: 73 (20 @ 02:02) (70 - 124)  BP: 191/92 (20 @ 22:59) (149/75 - 191/92)  RR: 25 (20 @ 22:31) (22 - 25)  SpO2: 97% (20 @ 02:02) (95% - 98%)                        12.1   15.93 )-----------( 333      ( 2020 21:29 )             38.3     -    134<L>  |  100  |  50<H>  ----------------------------<  135<H>  5.0   |  25  |  0.95    Ca    8.7      2020 21:29    TPro  7.5  /  Alb  2.1<L>  /  TBili  0.4  /  DBili  x   /  AST  34  /  ALT  24  /  AlkPhos  88  02-07    PT/INR - ( 2020 23:34 )   PT: 11.4 sec;   INR: 1.02 ratio         PTT - ( 2020 23:34 )  PTT:27.5 sec  LIVER FUNCTIONS - ( 2020 21:29 )  Alb: 2.1 g/dL / Pro: 7.5 gm/dL / ALK PHOS: 88 U/L / ALT: 24 U/L / AST: 34 U/L / GGT: x           Urinalysis Basic - ( 2020 22:25 )    Color: Yellow / Appearance: Clear / S.015 / pH: x  Gluc: x / Ketone: Negative  / Bili: Negative / Urobili: Negative mg/dL   Blood: x / Protein: 30 mg/dL / Nitrite: Negative   Leuk Esterase: Trace / RBC: x / WBC 0-2   Sq Epi: x / Non Sq Epi: Occasional / Bacteria: Few        Home Medications:  allopurinol 100 mg oral tablet: 1 tab(s) orally once a day (15 Shahid 2016 05:47)  ALPRAZolam 0.25 mg oral tablet:  orally once a day in am (2017 13:14)  atorvastatin 10 mg oral tablet: 1 tab(s) orally once a day (at bedtime) (15 Shahid 2016 05:47)  calcium carbonate 600 mg oral tablet, chewable: 1 tab(s) orally once a day (15 Shahid 2016 05:47)  Cartia  mg/24 hours oral capsule, extended release: 1 cap(s) orally once a day in am   (2017 13:14)  digoxin 250 mcg (0.25 mg) oral tablet: 1 tab(s) orally once a day in am (2017 13:14)  multivitamin: 1 tab(s) orally once a day (15 Shahid 2016 05:47)  Namzaric 28 mg-10 mg oral capsule, extended release: 1 cap(s) orally once a day (15 Shahid 2016 05:47)  Vitamin D3 2000 intl units oral capsule: 1 cap(s) orally once a day (15 Shahid 2016 05:47)  Zetia 10 mg oral tablet: 1 tab(s) orally once a day (15 Shahid 2016 05:47)

## 2020-02-08 NOTE — H&P ADULT - ASSESSMENT
Patient is an 88 YO F with a PMH of advance dementia (nonverbal), CHF, HTN, AFib s/p PPM who presents to ED in respiratory distress.  Patient unable to provide history.  Placed on BiPAP in the ED.  Labs show leukocytosis, tropinemia, and significantly elevated BNP.  ED attending discussed case with Dr Lundberg who recommended no anticoagulation for now. Will admit to tele.    IMPROVE VTE Individual Risk Assessment          RISK                                                          Points  [  ] Previous VTE                                                3  [  ] Thrombophilia                                             2  [  ] Lower limb paralysis                                    2        (unable to hold up >15 seconds)    [  ] Current Cancer                                             2         (within 6 months)  [  ] Immobilization > 24 hrs                              1  [  ] ICU/CCU stay > 24 hours                            1  [  ] Age > 60                                                    1    IMPROVE VTE Score - 1

## 2020-02-08 NOTE — CONSULT NOTE ADULT - SUBJECTIVE AND OBJECTIVE BOX
Patient is a 89y old  Female who presents with a chief complaint of respiratory distress (2020 11:54)    HPI:  89 YO F with Advance dementia (nonverbal), CHF, HTN, AFib s/p PPM who presents to ED in Acute  Respiratory distress.  Son reported to ED attending tatc she was discharged on oxygen however unsure if the oxygen was working.   Placed on BiPAP in the ED.  Labs show leukocytosis, Elevated troponin, and significantly elevated BNP.   Pt not able to provide any history,    PAST MEDICAL & SURGICAL HISTORY:  Cardiac pacemaker  HTN (hypertension)  Alzheimer's dementia  LOLY (iron deficiency anemia)  Gout  Anxiety  Macular degeneration  Afib: was on coumadin until early , was discontinue d/t fall rsks per son ( lucila )  Head trauma: 2 subdural hematomas 2015  Cataract: left eye  Cholecystitis  Rotator cuff arthropathy: left shoulder  Uterine fibroid  Anemia  Infected hand  CA - breast cancer  Gout  High cholesterol  Hypertension  S/P cataract surgery, right:   S/P mastectomy, left:   S/P cataract surgery, left:   S/P knee replacement:   S/P cholecystectomy:   S/P rotator cuff repair:  - left  S/P hysterectomy: partial   History of cholecystectomy  History of knee replacement procedure of right knee  H/O total hysterectomy  History of total mastectomy of right breast  Gallbladder & bile duct stone    FAMILY HISTORY: not able to provide    SOCIAL HISTORY: not able to provide    Allergies  No Known Allergies    MEDICATIONS  (STANDING):  atorvastatin 10 milliGRAM(s) Oral at bedtime  digoxin     Tablet 0.25 milliGRAM(s) Oral daily  diltiazem    milliGRAM(s) Oral daily  donepezil 10 milliGRAM(s) Oral at bedtime  enoxaparin Injectable 40 milliGRAM(s) SubCutaneous daily  furosemide   Injectable 40 milliGRAM(s) IV Push two times a day  memantine 10 milliGRAM(s) Oral daily  piperacillin/tazobactam IVPB.. 3.375 Gram(s) IV Intermittent every 8 hours    REVIEW OF SYSTEMS:  not able to provide.    Vital Signs Last 24 Hrs  T(C): 37 (2020 17:04), Max: 37.6 (2020 09:58)  T(F): 98.6 (2020 17:04), Max: 99.6 (2020 09:58)  HR: 88 (2020 21:41) (70 - 88)  BP: 115/60 (2020 17:04) (99/45 - 191/92)  BP(mean): 71 (2020 09:24) (71 - 71)  RR: 16 (2020 17:04) (16 - 35)  SpO2: 98% (2020 21:41) (95% - 100%)    PHYSICAL EXAM:  GEN:         lethargic, comfortable.  HEENT:     BIPAP   RESP:       decreased air entry.  CVS:           Regular rate and rhythm.   ABD:         Soft, non-tender, non-distended;   SKIN:          Warm and dry. diffused echymosis  EXTR:           edema  CNS:           lethargic  PSYCH:        Dementia    LABS:                        12.1   15.93 )-----------( 333      ( 2020 21:29 )             38.3         134<L>  |  100  |  50<H>  ----------------------------<  135<H>  5.0   |  25  |  0.95    Ca    8.7      2020 21:29    TPro  7.5  /  Alb  2.1<L>  /  TBili  0.4  /  DBili  x   /  AST  34  /  ALT  24  /  AlkPhos  88      PT/INR - ( 2020 23:34 )   PT: 11.4 sec;   INR: 1.02 ratio      PTT - ( 2020 23:34 )  PTT:27.5 sec   @ 03:30  pH: 7.57  pCO2: 32  pO2: 82  SaO2: 97   @ 22:02  pH: 7.55  pCO2: 32  pO2: 85  SaO2: 97    Urinalysis Basic - ( 2020 22:25 )    Color: Yellow / Appearance: Clear / S.015 / pH: x  Gluc: x / Ketone: Negative  / Bili: Negative / Urobili: Negative mg/dL   Blood: x / Protein: 30 mg/dL / Nitrite: Negative   Leuk Esterase: Trace / RBC: x / WBC 0-2   Sq Epi: x / Non Sq Epi: Occasional / Bacteria: Few    Culture - Blood (collected 20 @ 01:48)  Source: .Blood Blood  Final Report (20 @ 02:00):    No growth at 5 days.    Culture - Blood (collected 20 @ 01:48)  Source: .Blood Blood  Final Report (20 @ 02:00):    No growth at 5 days.    Culture - Urine (collected 20 @ 01:32)  Source: .Urine Clean Catch (Midstream)  Final Report (20 @ 22:49):    No growth    EKG: Ventricular paced rhythm    RADIOLOGY & ADDITIONAL STUDIES:  < from: Xray Chest 1 View- PORTABLE-Urgent (20 @ 21:43) >  EXAM:  XR CHEST PORTABLE URGENT 1V                          PROCEDURE DATE:  2020      INTERPRETATION:  XR CHEST URGENT    Single AP view    HISTORY:  Shortness of breath    Comparison: Chest x-ray 2020    Left dual-lead pacer.    The cardiac silhouette is enlarged. Bibasilar opacities and perihilar opacities. Small bilateral pleural effusions and/or atelectasis.    IMPRESSION: Pulmonary edema versus multifocal pneumonia unchanged from 2020    SAVI LONG M.D.,ATTENDING RADIOLOGIST  This document has been electronically signed. 2020 10:39AM      ASSESSMENT AND PLAN:  ·	Acute hypoxic Respiratory failure.  ·	Acute pulmonary edema.  ·	CHF Systolic/Diastolic.  ·	Bilateral pleural effusion.  ·	A Fib S/P PPM  ·	elevated troponin.  ·	Leukocytosis.  ·	Renal Insuffiencey.  ·	Advanced Dementia.    Continue BIPAP, titrate down FIO2 as tolerated.  Continue diuretics.  On Empiric antibiotics.  Procalcitonin in am,.

## 2020-02-08 NOTE — PATIENT PROFILE ADULT - NSPROSPHOSPCHAPLAINYN_GEN_A_NUR
Above RN's/Staff's notes reviewed.  Reviewed medications and allergies.    SUBJECTIVE:    Rudy Hagen is a 67 year old male who presents with Pt was working with a wire wheel ans \"it shot out a piece of wire\" that landed on coat and into skin. Pt removed coat to garb the wire but it went into skin and pt is unable to take it out.   Denies known Latex allergy or symptoms of Latex sensitivity.  Medications reviewed and updated.  Last Td 2012.       OBJECTIVE:    Vitals:    02/11/19 1848   BP: 113/59   Pulse: 71   Resp: 16   Temp: 98.5 °F (36.9 °C)       General appearance:  Healthy, alert, in no distress, cooperative  Skin:  Puncture wound noted left forearm. Discomfort noted in that vicinity where the patient claimed foreign body was present. No signs of infection at this time with erythema no lymphangitis or lymphadenopathy  Neck:  Supple, no lymphadenopathy.  Lungs:  Clear to auscultation.  Cardiac:  Regular rate and rhythm, no rubs, click gallops or murmurs      ASSESSMENT:  1. Foreign body in skin    2. Foreign body (FB) in soft tissue          PLAN:  Orders Placed This Encounter   • XR Forearm 2 View Left   • SERVICE TO SURGERY GENERAL   • amoxicillin-clavulanate (AUGMENTIN) 875-125 MG per tablet   • traMADol (ULTRAM) 50 MG tablet     X-ray revealed foreign body left forearm proximally 1 inch in length and the diameter of thin wire  · Foreign body. Attempted removal procedural note. After informed consent and under sterile conditions we attempted to remove this foreign body. We utilized 7 ML's of Xylocaine 1% with adrenaline for anesthesia. We made a horizontal incision perpendicular to the foreign body in the presumed location. Unfortunately after 10  minutes of gentle exploration with a rat tooth forceps. We were unable to remove the foreign body. I applied Steri-Strips and a light dressing. Also place the patient on antibiotics and pain medication.  · Consultation with general surgeon to have foreign body  removed under fluoroscopy     no

## 2020-02-08 NOTE — CONSULT NOTE ADULT - SUBJECTIVE AND OBJECTIVE BOX
89 y/o F with a PMHx of advanced dementia (nonverbal), CHF, HTN, AFib s/p PPM presented to ED for respiratory distress.  Patient unable to provide history.  Son reported to ED attending that she was discharged on oxygen however unsure if the oxygen was working. Placed on BiPAP in the ED.  Labs significant for leukocytosis, tropinemia, and elevated BNP. ICU consulted for elevated troponin, ?STEMI.    ICU Vital Signs Last 24 Hrs  T(C): 36.7 (07 Feb 2020 20:15), Max: 36.7 (07 Feb 2020 20:15)  T(F): 98.1 (07 Feb 2020 20:15), Max: 98.1 (07 Feb 2020 20:15)  HR: 70 (08 Feb 2020 04:35) (70 - 124)  BP: 115/46 (08 Feb 2020 03:24) (99/45 - 191/92)  BP(mean): --  ABP: --  ABP(mean): --  RR: 25 (07 Feb 2020 22:31) (22 - 25)  SpO2: 98% (08 Feb 2020 04:35) (95% - 98%)    PHYSICAL EXAM:  GENERAL: frail elderly woman laying in bed, NAD  HEAD:  NC/AC  EYES: EOMI, PERRLA, conjunctiva and sclera clear  NECK: Supple, No JVD  CHEST/LUNG: CTAB. No cough, wheeze, rales.   HEART: Afib. No murmurs, rubs, or gallops.   ABDOMEN: Soft, Nontender, Nondistended. Bowel sounds present  EXTREMITIES:  2+ Peripheral Pulses, No clubbing, cyanosis, or edema  MS: AAOx3  NEUROLOGY: non-focal  SKIN: No rashes or lesions 89 y/o F with a PMHx of advanced dementia (nonverbal), CHF, HTN, AFib s/p PPM presented to ED for respiratory distress.  Patient unable to provide history.  Son reported to ED attending that she was discharged on oxygen however unsure if the oxygen was working. Placed on BiPAP in the ED.  Labs significant for leukocytosis, tropinemia, and elevated BNP. ICU consulted for elevated troponin, ?STEMI.    ICU Vital Signs Last 24 Hrs  T(C): 36.7 (07 Feb 2020 20:15), Max: 36.7 (07 Feb 2020 20:15)  T(F): 98.1 (07 Feb 2020 20:15), Max: 98.1 (07 Feb 2020 20:15)  HR: 70 (08 Feb 2020 04:35) (70 - 124)  BP: 115/46 (08 Feb 2020 03:24) (99/45 - 191/92)  BP(mean): --  ABP: --  ABP(mean): --  RR: 25 (07 Feb 2020 22:31) (22 - 25)  SpO2: 98% (08 Feb 2020 04:35) (95% - 98%)    PHYSICAL EXAM:  GENERAL: frail elderly woman laying in bed, NAD. On Bipap  HEAD:  NC/AC  EYES: EOMI, PERRLA, conjunctiva and sclera clear  NECK: Supple, No JVD  CHEST/LUNG: CTAB. No cough, wheeze, rales.   HEART: Afib. No murmurs, rubs, or gallops.   ABDOMEN: Soft, Nontender, Nondistended. Bowel sounds present  EXTREMITIES:  2+ Peripheral Pulses, No clubbing, cyanosis, or edema  MS: unable to assess 2/2 pt nonverbal  NEUROLOGY: non-focal  SKIN: minor bruises/lesions on b/l LE 89 y/o F with a PMHx of advanced dementia (nonverbal), CHF, HTN, AFib s/p PPM presented to ED for respiratory distress.  Patient unable to provide history.  Son reported to ED attending that she was discharged on oxygen however unsure if the oxygen was working. Placed on BiPAP in the ED.  Labs significant for leukocytosis, tropinemia, and elevated BNP. ICU consulted for elevated troponin, ?STEMI.    ICU Vital Signs Last 24 Hrs  T(C): 36.7 (2020 20:15), Max: 36.7 (2020 20:15)  T(F): 98.1 (2020 20:15), Max: 98.1 (2020 20:15)  HR: 70 (2020 04:35) (70 - 124)  BP: 115/46 (2020 03:24) (99/45 - 191/92)  BP(mean): --  ABP: --  ABP(mean): --  RR: 25 (2020 22:31) (22 - 25)  SpO2: 98% (2020 04:35) (95% - 98%)    PHYSICAL EXAM:  GENERAL: frail elderly woman laying in bed, NAD. On Bipap  HEAD:  NC/AC  EYES: EOMI, PERRLA, conjunctiva and sclera clear  NECK: Supple, No JVD  CHEST/LUNG: CTAB. No cough, wheeze, rales.   HEART: Afib. No murmurs, rubs, or gallops.   ABDOMEN: Soft, Nontender, Nondistended. Bowel sounds present  EXTREMITIES:  2+ Peripheral Pulses, No clubbing, cyanosis, or edema  MS: unable to assess 2/2 pt nonverbal  NEUROLOGY: non-focal  SKIN: minor bruises/lesions on b/l LE    .  LABS:                         12.1   15.93 )-----------( 333      ( 2020 21:29 )             38.3     02-07    134<L>  |  100  |  50<H>  ----------------------------<  135<H>  5.0   |  25  |  0.95    Ca    8.7      2020 21:29    TPro  7.5  /  Alb  2.1<L>  /  TBili  0.4  /  DBili  x   /  AST  34  /  ALT  24  /  AlkPhos  88      PT/INR - ( 2020 23:34 )   PT: 11.4 sec;   INR: 1.02 ratio         PTT - ( 2020 23:34 )  PTT:27.5 sec  Urinalysis Basic - ( 2020 22:25 )    Color: Yellow / Appearance: Clear / S.015 / pH: x  Gluc: x / Ketone: Negative  / Bili: Negative / Urobili: Negative mg/dL   Blood: x / Protein: 30 mg/dL / Nitrite: Negative   Leuk Esterase: Trace / RBC: x / WBC 0-2   Sq Epi: x / Non Sq Epi: Occasional / Bacteria: Few      Serum Pro-Brain Natriuretic Peptide: 71381 pg/mL ( @ 21:29)    Lactate, Blood: 1.6 mmol/L ( @ 21:29)

## 2020-02-08 NOTE — H&P ADULT - PROBLEM SELECTOR PLAN 3
will hold xanax for now Started on Zosyn and Vanco in the ED, will continue.  Follow blood cultures - de-escalate antibiotics as needed   ID consult in Am - Otilio

## 2020-02-08 NOTE — CONSULT NOTE ADULT - ASSESSMENT
89-year-old female with advanced dementia, chronic atrial fibrillation, hypertension, hyperlipidemia.  Status post pacemaker.  Admitted again with evidence of acute respiratory distress with chest x-ray suggestive of vascular congestion and bilateral lung opacification and elevated BNP. positive troponins noted.    Impression:  ·	Acute on chronic HFpEF  ·	r/o NSTEMI  ·	Unresolved PNA  ·	Chronic afib  ·	Essential HTN  ·	HLD  ·	Advanced dementia    Plan:  -Cont. on IV Lasix, evidence of bibasilar rales and lower extremity edema.  -Monitor electrolytes/renal function/daily weight  -Antibiotics as per medicine  -Not a candidate for aggressive therapeutic measures given advanced dementia and debility. Pt is DNR. Since no planned cath, not anticoagulating. Continue Rx with ASA, statin. Recheck TTE to assess for HFrEF. Start bbl instead of CACB's.

## 2020-02-08 NOTE — CONSULT NOTE ADULT - ASSESSMENT
88 y/o F with a PMHx of advanced dementia (nonverbal), CHF, HTN, AFib s/p PPM admitted to [facility] for respiratory distress requiring BiPAP. Labs significant for leukocytosis, tropinemia, and elevated BNP. ICU consulted for elevated troponin, ?STEMI. 89 y/o F with a PMHx of advanced dementia (nonverbal), CHF, HTN, AFib s/p PPM admitted to tele for respiratory distress requiring BiPAP w/ ?PNA/ B/L pleural effusions. Labs significant for leukocytosis, tropinemia, and elevated BNP. ICU consulted for elevated troponin, ?STEMI.  -Resp/mixed alkalosis, continue BiPAP  -Continue trending troponin to peak. Cardiology following, appreciate recs  -Lasix for diuresis  -Leukocytosis secondary to possible PNA/UTI, would treat with empiric antibiotics    Pt does not warrant ICU level of care at this time. Should patient decompensate, please feel free to reconsult

## 2020-02-08 NOTE — H&P ADULT - PROBLEM SELECTOR PLAN 1
Cardiology Dr Lundberg consulted  Continue Lasix 40 IVP.  Son refusing curtis placement, unable to track volume of urinary output.

## 2020-02-08 NOTE — H&P ADULT - NSHPPHYSICALEXAM_GEN_ALL_CORE
Physical exam:  General: patient in no acute distress, resting comfortably  Cardio: S1/S2 +ve, regular rate and rhythm, no M/G/R  Resp: bilateral rales,   GI: abdomen soft, nontender, non distended, no guarding, BS +ve x 4  Ext: 2+ pedal edema  Neuro: CN 2-12 intact, no significant motor or sensory deficits.

## 2020-02-08 NOTE — PROGRESS NOTE ADULT - ASSESSMENT
acute respiratory failure/ pul edema    opacities  bilateral   lung    severe dementia   AIcd  a. fib

## 2020-02-08 NOTE — H&P ADULT - HISTORY OF PRESENT ILLNESS
Patient is an 89 YO F with a PMH of advance dementia (nonverbal), CHF, HTN, AFib s/p PPM who presents to ED in respiratory distress.  Patient unable to provide history.  Son reported to ED attending tatc she was discharged on oxygen however unsure if the oxygen was working.    Placed on BiPAP in the ED.  Labs show leukocytosis, tropinemia, and significantly elevated BNP.  ED attending discussed case with Dr Lundberg who recommended no anticoagulation for now. Will admit to tele.

## 2020-02-08 NOTE — CONSULT NOTE ADULT - SUBJECTIVE AND OBJECTIVE BOX
CARDIOLOGY CONSULTATION NOTE                                                                               AMILCAR GALVEZ is a 89y Female with a PMH of advanced dementia (nonverbal), CHF, HTN, AFib s/p PPM who presented to the ED in acute respiratory distress.  As per son reported to ED attending that she was discharged on oxygen however unsure if the oxygen was working.    Placed on BiPAP in the ED.  Labs show leukocytosis, elevated troponins and significantly elevated BNP.  Patient paced, questionable evidence of NSTEMI.    REVIEW OF SYSTEMS:  -----------------------------    CONSTITUTIONAL: No fever, weight loss, or fatigue  EYES: No eye pain, visual disturbances, or discharge  ENMT:  No difficulty hearing, tinnitus, vertigo; No sinus or throat pain  NECK: No pain or stiffness  BREASTS: No pain, masses, or nipple discharge  RESPIRATORY: No cough, wheezing, chills or hemoptysis; No shortness of breath  CARDIOVASCULAR: See HPI  GASTROINTESTINAL: No abdominal or epigastric pain. No nausea, vomiting, or hematemesis; No diarrhea or constipation. No melena or hematochezia.  GENITOURINARY: No dysuria, frequency, hematuria, or incontinence  NEUROLOGICAL: No headaches, memory loss, loss of strength, numbness, or tremors  SKIN: No itching, burning, rashes, or lesions   LYMPH NODES: No enlarged glands  ENDOCRINE: No heat or cold intolerance; No hair loss  MUSCULOSKELETAL: No joint pain or swelling; No muscle, back, or extremity pain  PSYCHIATRIC: No depression, anxiety, mood swings, or difficulty sleeping  HEME/LYMPH: No easy bruising, or bleeding gums  ALLERGY AND IMMUNOLOGIC: No hives or eczema    Home Medications:  allopurinol 100 mg oral tablet: 1 tab(s) orally once a day (15 Shahid 2016 05:47)  ALPRAZolam 0.25 mg oral tablet:  orally once a day in am (16 Nov 2017 13:14)  atorvastatin 10 mg oral tablet: 1 tab(s) orally once a day (at bedtime) (15 Shahid 2016 05:47)  calcium carbonate 600 mg oral tablet, chewable: 1 tab(s) orally once a day (15 Shahid 2016 05:47)  Cartia  mg/24 hours oral capsule, extended release: 1 cap(s) orally once a day in am   (16 Nov 2017 13:14)  digoxin 250 mcg (0.25 mg) oral tablet: 1 tab(s) orally once a day in am (16 Nov 2017 13:14)  multivitamin: 1 tab(s) orally once a day (15 Shahid 2016 05:47)  Namzaric 28 mg-10 mg oral capsule, extended release: 1 cap(s) orally once a day (15 Shahid 2016 05:47)  Vitamin D3 2000 intl units oral capsule: 1 cap(s) orally once a day (15 Shahid 2016 05:47)  Zetia 10 mg oral tablet: 1 tab(s) orally once a day (15 Shahid 2016 05:47)      MEDICATIONS  (STANDING):  atorvastatin 10 milliGRAM(s) Oral at bedtime  digoxin     Tablet 0.25 milliGRAM(s) Oral daily  diltiazem    milliGRAM(s) Oral daily  donepezil 10 milliGRAM(s) Oral at bedtime  enoxaparin Injectable 40 milliGRAM(s) SubCutaneous daily  furosemide   Injectable 40 milliGRAM(s) IV Push two times a day  memantine 10 milliGRAM(s) Oral daily  piperacillin/tazobactam IVPB.. 3.375 Gram(s) IV Intermittent every 8 hours      ALLERGIES: No Known Allergies      FAMILY HISTORY: NC      PHYSICAL EXAMINATION:  -----------------------------  T(C): 36.7 (02-07-20 @ 20:15), Max: 36.7 (02-07-20 @ 20:15)  HR: 70 (02-08-20 @ 04:35) (70 - 124)  BP: 115/46 (02-08-20 @ 03:24) (99/45 - 191/92)  RR: 25 (02-07-20 @ 22:31) (22 - 25)  SpO2: 98% (02-08-20 @ 04:35) (95% - 98%)  Wt(kg): --    Height (cm): 152.4 (02-07 @ 20:15)  Weight (kg): 49.9 (02-07 @ 20:15)  BMI (kg/m2): 21.5 (02-07 @ 20:15)  BSA (m2): 1.45 (02-07 @ 20:15)    Constitutional: well developed, normal appearance, well groomed, well nourished, no deformities and no acute distress.   Eyes: the conjunctiva exhibited no abnormalities and the eyelids demonstrated no xanthelasmas.   HEENT: normal oral mucosa, no oral pallor and no oral cyanosis.   Neck: normal jugular venous A waves present, normal jugular venous V waves present and no jugular venous mckeon A waves.   Pulmonary: no respiratory distress, normal respiratory rhythm and effort, no accessory muscle use and lungs were clear to auscultation bilaterally.   Cardiovascular: heart rate and rhythm were normal, normal S1 and S2 and no murmur, gallop, rub, heave or thrill are present.   Abdomen: soft, non-tender, no hepato-splenomegaly and no abdominal mass palpated.   Musculoskeletal: the gait could not be assessed..   Extremities: no clubbing of the fingernails, no localized cyanosis, no petechial hemorrhages and no ischemic changes.   Skin: normal skin color and pigmentation, no rash, no venous stasis, no skin lesions, no skin ulcer and no xanthoma was observed.   Psychiatric: oriented to person, place, and time, the affect was normal, the mood was normal and not feeling anxious.     ECG:  -------        LABS:   --------  02-07    134<L>  |  100  |  50<H>  ----------------------------<  135<H>  5.0   |  25  |  0.95    Ca    8.7      07 Feb 2020 21:29    TPro  7.5  /  Alb  2.1<L>  /  TBili  0.4  /  DBili  x   /  AST  34  /  ALT  24  /  AlkPhos  88  02-07                         12.1   15.93 )-----------( 333      ( 07 Feb 2020 21:29 )             38.3     PT/INR - ( 07 Feb 2020 23:34 )   PT: 11.4 sec;   INR: 1.02 ratio         PTT - ( 07 Feb 2020 23:34 )  PTT:27.5 sec  02-07 @ 21:29 BNP: 13173 pg/mL    CARDIAC MARKERS ( 07 Feb 2020 21:29 )  2.110 ng/mL / x     / x     / x     / x          02-07 @ 21:29 CPK total:--, CKMB --, Troponin I - 2.110 ng/mL<H>          RADIOLOGY REPORTS:  -----------------------------  report pending      ECHOCARDIOGRAM:  ---------------------------  Last echo in office from 2016 showed normal left ventricular function with evidence of mild AS, MR, TR and moderate pulmonary hypertension.

## 2020-02-08 NOTE — PROGRESS NOTE ADULT - SUBJECTIVE AND OBJECTIVE BOX
Patient is a 89y old  Female who presents with a chief complaint of respiratory distress (2020 07:38)  patient admitted for respiratory distress. . On BIpap. . no fever. Probnp elevated.    severe dementia.  verbal when she wants to be.     INTERVAL HPI/OVERNIGHT EVENTS:  PAST MEDICAL & SURGICAL HISTORY:  Cardiac pacemaker  HTN (hypertension)  Alzheimer's dementia  LOLY (iron deficiency anemia)  Gout  Anxiety  Macular degeneration  Afib: was on coumadin until early , was discontinue d/t fall rsks per son ( lucila )  Head trauma: 2 subdural hematomas 2015  Cataract: left eye  Cholecystitis  Rotator cuff arthropathy: left shoulder  Uterine fibroid  Anemia  Infected hand  CA - breast cancer  Gout  High cholesterol  Hypertension  S/P cataract surgery, right:   S/P mastectomy, left:   S/P cataract surgery, left:   S/P knee replacement:   S/P cholecystectomy:   S/P rotator cuff repair:  - left  S/P hysterectomy: partial 1992  History of cholecystectomy  History of knee replacement procedure of right knee  H/O total hysterectomy  History of total mastectomy of right breast  Gallbladder & bile duct stone      MEDICATIONS  (STANDING):  atorvastatin 10 milliGRAM(s) Oral at bedtime  digoxin     Tablet 0.25 milliGRAM(s) Oral daily  diltiazem    milliGRAM(s) Oral daily  donepezil 10 milliGRAM(s) Oral at bedtime  enoxaparin Injectable 40 milliGRAM(s) SubCutaneous daily  furosemide   Injectable 40 milliGRAM(s) IV Push two times a day  memantine 10 milliGRAM(s) Oral daily  piperacillin/tazobactam IVPB.. 3.375 Gram(s) IV Intermittent every 8 hours    MEDICATIONS  (PRN):      Allergies    No Known Allergies    Intolerances        REVIEW OF SYSTEMS:  CONSTITUTIONAL: No fever, weight loss, or fatigue  EYES: No eye pain, visual disturbances, or discharge  ENMT:  No difficulty hearing, tinnitus, vertigo; No sinus or throat pain  NECK: No pain or stiffness  BREASTS: No pain, masses, or nipple discharge  RESPIRATORY: No cough, wheezing, chills or hemoptysis; No shortness of breath  CARDIOVASCULAR: No chest pain, palpitations, dizziness, or leg swelling  GASTROINTESTINAL: No abdominal or epigastric pain. No nausea, vomiting, or hematemesis; No diarrhea or constipation. No melena or hematochezia.  GENITOURINARY: No dysuria, frequency, hematuria, or incontinence  NEUROLOGICAL: No headaches, memory loss, loss of strength, numbness, or tremors  SKIN: No itching, burning, rashes, or lesions   LYMPH NODES: No enlarged glands  ENDOCRINE: No heat or cold intolerance; No hair loss  MUSCULOSKELETAL: No joint pain or swelling; No muscle, back, or extremity pain  PSYCHIATRIC: No depression, anxiety, mood swings, or difficulty sleeping  HEME/LYMPH: No easy bruising, or bleeding gums  ALLERY AND IMMUNOLOGIC: No hives or eczema    Vital Signs Last 24 Hrs  T(C): 37.3 (2020 10:49), Max: 37.6 (2020 09:58)  T(F): 99.1 (2020 10:49), Max: 99.6 (2020 09:58)  HR: 82 (2020 10:49) (70 - 124)  BP: 144/62 (2020 10:49) (99/45 - 191/92)  BP(mean): 71 (2020 09:24) (71 - 71)  RR: 20 (2020 10:49) (20 - 35)  SpO2: 100% (2020 10:49) (95% - 100%)    PHYSICAL EXAM:  GENERAL: NAD, well-groomed, well-developed  HEAD:  Atraumatic, Normocephalic  EYES: EOMI, PERRLA, conjunctiva and sclera clear  ENMT: No tonsillar erythema, exudates, or enlargement; Moist mucous membranes, Good dentition, No lesions  NECK: Supple, No JVD, Normal thyroid  NERVOUS SYSTEM:  Alert & Oriented X3, Good concentration; Motor Strength 5/5 B/L upper and lower extremities; DTRs 2+ intact and symmetric  CHEST/LUNG: Clear to percussion bilaterally; No rales, rhonchi, wheezing, or rubs  HEART: Regular rate and rhythm; No murmurs, rubs, or gallops  ABDOMEN: Soft, Nontender, Nondistended; Bowel sounds present  EXTREMITIES:  2+ Peripheral Pulses, No clubbing, cyanosis, or edema  LYMPH: No lymphadenopathy noted  SKIN: No rashes or lesions    LABS:                        12.1   15.93 )-----------( 333      ( 2020 21:29 )             38.3     02-07    134<L>  |  100  |  50<H>  ----------------------------<  135<H>  5.0   |  25  |  0.95    Ca    8.7      2020 21:29    TPro  7.5  /  Alb  2.1<L>  /  TBili  0.4  /  DBili  x   /  AST  34  /  ALT  24  /  AlkPhos  88        PT/INR - ( 2020 23:34 )   PT: 11.4 sec;   INR: 1.02 ratio         PTT - ( 2020 23:34 )  PTT:27.5 sec  Urinalysis Basic - ( 2020 22:25 )    Color: Yellow / Appearance: Clear / S.015 / pH: x  Gluc: x / Ketone: Negative  / Bili: Negative / Urobili: Negative mg/dL   Blood: x / Protein: 30 mg/dL / Nitrite: Negative   Leuk Esterase: Trace / RBC: x / WBC 0-2   Sq Epi: x / Non Sq Epi: Occasional / Bacteria: Few      CAPILLARY BLOOD GLUCOSE        ABG - ( 2020 03:30 )  pH, Arterial: x     pH, Blood: 7.57  /  pCO2: 32    /  pO2: 82    / HCO3: 29    / Base Excess: 7.0   /  SaO2: 97                CARDIAC MARKERS ( 2020 08:46 )  1.850 ng/mL / x     / x     / x     / x      CARDIAC MARKERS ( 2020 21:29 )  2.110 ng/mL / x     / x     / x     / x                RADIOLOGY & ADDITIONAL TESTS:    Imaging Personally Reviewed:  [ ] YES  [ ] NO    Consultant(s) Notes Reviewed:  [ ] YES  [ ] NO    Care Discussed with Consultants/Other Providers [ ] YES  [ ] NO    Care discussed with family,         [  ]   yes  [  ]  No    imp:    stable[ ]    unstable[  ]     improving [   ]       unchanged  [ x ]                Plans:  Continue present plans  [ x ]               New consult [  ]   specialty  ....Palliative care./ Pulmonary.               order test[  ]    test name.                  Discharge Planning  [  ]

## 2020-02-08 NOTE — PATIENT PROFILE ADULT - NSPROMUTINFOINDIVIDFT_GEN_A_NUR
patient goes in deep sleep sometimes its hard to arouse her but as per son its normal. he said" Don't Panick if you cant wake her up"

## 2020-02-08 NOTE — H&P ADULT - NSCORESITESY/N_GEN_A_CORE_RD
1. Schedule colonoscopy/EGD with [facility] [Diagnosis: iron deficiency anemia]    2.  bowel prep from pharmacy ([facility])  --Buy over the counter dulcolax laxative, and take daily for 3 days prior to drinking the bowel prep.      3. Continue all medic Yes

## 2020-02-09 LAB
ANION GAP SERPL CALC-SCNC: 9 MMOL/L — SIGNIFICANT CHANGE UP (ref 5–17)
BUN SERPL-MCNC: 54 MG/DL — HIGH (ref 7–23)
CALCIUM SERPL-MCNC: 8.5 MG/DL — SIGNIFICANT CHANGE UP (ref 8.5–10.1)
CHLORIDE SERPL-SCNC: 96 MMOL/L — SIGNIFICANT CHANGE UP (ref 96–108)
CO2 SERPL-SCNC: 31 MMOL/L — SIGNIFICANT CHANGE UP (ref 22–31)
CREAT SERPL-MCNC: 1.08 MG/DL — SIGNIFICANT CHANGE UP (ref 0.5–1.3)
CULTURE RESULTS: NO GROWTH — SIGNIFICANT CHANGE UP
GLUCOSE SERPL-MCNC: 116 MG/DL — HIGH (ref 70–99)
HCT VFR BLD CALC: 32.2 % — LOW (ref 34.5–45)
HGB BLD-MCNC: 10.2 G/DL — LOW (ref 11.5–15.5)
MCHC RBC-ENTMCNC: 29.1 PG — SIGNIFICANT CHANGE UP (ref 27–34)
MCHC RBC-ENTMCNC: 31.7 GM/DL — LOW (ref 32–36)
MCV RBC AUTO: 91.7 FL — SIGNIFICANT CHANGE UP (ref 80–100)
NRBC # BLD: 0 /100 WBCS — SIGNIFICANT CHANGE UP (ref 0–0)
PLATELET # BLD AUTO: 305 K/UL — SIGNIFICANT CHANGE UP (ref 150–400)
POTASSIUM SERPL-MCNC: 3.6 MMOL/L — SIGNIFICANT CHANGE UP (ref 3.5–5.3)
POTASSIUM SERPL-SCNC: 3.6 MMOL/L — SIGNIFICANT CHANGE UP (ref 3.5–5.3)
PROCALCITONIN SERPL-MCNC: 0.49 NG/ML — HIGH (ref 0.02–0.1)
RBC # BLD: 3.51 M/UL — LOW (ref 3.8–5.2)
RBC # FLD: 14.8 % — HIGH (ref 10.3–14.5)
SODIUM SERPL-SCNC: 136 MMOL/L — SIGNIFICANT CHANGE UP (ref 135–145)
SPECIMEN SOURCE: SIGNIFICANT CHANGE UP
WBC # BLD: 10.17 K/UL — SIGNIFICANT CHANGE UP (ref 3.8–10.5)
WBC # FLD AUTO: 10.17 K/UL — SIGNIFICANT CHANGE UP (ref 3.8–10.5)

## 2020-02-09 PROCEDURE — 99232 SBSQ HOSP IP/OBS MODERATE 35: CPT

## 2020-02-09 RX ADMIN — Medication 40 MILLIGRAM(S): at 17:39

## 2020-02-09 RX ADMIN — MEMANTINE HYDROCHLORIDE 10 MILLIGRAM(S): 10 TABLET ORAL at 11:38

## 2020-02-09 RX ADMIN — PIPERACILLIN AND TAZOBACTAM 25 GRAM(S): 4; .5 INJECTION, POWDER, LYOPHILIZED, FOR SOLUTION INTRAVENOUS at 06:02

## 2020-02-09 RX ADMIN — PIPERACILLIN AND TAZOBACTAM 25 GRAM(S): 4; .5 INJECTION, POWDER, LYOPHILIZED, FOR SOLUTION INTRAVENOUS at 21:43

## 2020-02-09 RX ADMIN — ATORVASTATIN CALCIUM 10 MILLIGRAM(S): 80 TABLET, FILM COATED ORAL at 21:43

## 2020-02-09 RX ADMIN — Medication 40 MILLIGRAM(S): at 06:02

## 2020-02-09 RX ADMIN — PIPERACILLIN AND TAZOBACTAM 25 GRAM(S): 4; .5 INJECTION, POWDER, LYOPHILIZED, FOR SOLUTION INTRAVENOUS at 13:19

## 2020-02-09 RX ADMIN — ENOXAPARIN SODIUM 40 MILLIGRAM(S): 100 INJECTION SUBCUTANEOUS at 11:38

## 2020-02-09 NOTE — CONSULT NOTE ADULT - ASSESSMENT
88 y/o F with a PMHx of advanced dementia (nonverbal), CHF, HTN, AFib s/p PPM admitted to tele for respiratory distress requiring BiPAP w/ ?PNA/ B/L pleural effusions. Labs significant for leukocytosis, tropinemia, and elevated BNP.   patient was just discharge on po vantin and zithromax on feb 5   -Leukocytosis secondary to possible PNA/UTI, vas stress response  xray resolution can lag behind clinical recovery   follow procalcitonin   has responded to zosyn   finish 5-7 days   cardiac pulm follow up   will follow with you thanks

## 2020-02-09 NOTE — PROGRESS NOTE ADULT - ASSESSMENT
s/p respiratory distress,- improved. elevated troponin.   elevated Dig level- dig discontinued.  demetia   dnr status  Goals of care discussed with son who  understands palliative care. is appropriate for his mother

## 2020-02-09 NOTE — PROGRESS NOTE ADULT - SUBJECTIVE AND OBJECTIVE BOX
Patient is a 89y old  Female who presents with a chief complaint of respiratory distress (2020 21:44)  no distress.    she is at baseline.   son at bedside.   Digoxin level elevated, . dig discontinued.    INTERVAL HPI/OVERNIGHT EVENTS:  PAST MEDICAL & SURGICAL HISTORY:  Cardiac pacemaker  HTN (hypertension)  Alzheimer's dementia  LOLY (iron deficiency anemia)  Gout  Anxiety  Macular degeneration  Afib: was on coumadin until early , was discontinue d/t fall rsks per son ( lucila )  Head trauma: 2 subdural hematomas 2015  Cataract: left eye  Cholecystitis  Rotator cuff arthropathy: left shoulder  Uterine fibroid  Anemia  Infected hand  CA - breast cancer  Gout  High cholesterol  Hypertension  S/P cataract surgery, right:   S/P mastectomy, left:   S/P cataract surgery, left:   S/P knee replacement:   S/P cholecystectomy:   S/P rotator cuff repair:  - left  S/P hysterectomy: partial   History of cholecystectomy  History of knee replacement procedure of right knee  H/O total hysterectomy  History of total mastectomy of right breast  Gallbladder & bile duct stone      MEDICATIONS  (STANDING):  atorvastatin 10 milliGRAM(s) Oral at bedtime  diltiazem    milliGRAM(s) Oral daily  donepezil 10 milliGRAM(s) Oral at bedtime  enoxaparin Injectable 40 milliGRAM(s) SubCutaneous daily  furosemide   Injectable 40 milliGRAM(s) IV Push two times a day  memantine 10 milliGRAM(s) Oral daily  piperacillin/tazobactam IVPB.. 3.375 Gram(s) IV Intermittent every 8 hours    MEDICATIONS  (PRN):      Allergies    No Known Allergies    Intolerances        REVIEW OF SYSTEMS:  CONSTITUTIONAL: No fever, weight loss, or fatigue  EYES: No eye pain, visual disturbances, or discharge  ENMT:  No difficulty hearing, tinnitus, vertigo; No sinus or throat pain  NECK: No pain or stiffness  BREASTS: No pain, masses, or nipple discharge  RESPIRATORY: No cough, wheezing, chills or hemoptysis; No shortness of breath  CARDIOVASCULAR: No chest pain, palpitations, dizziness, or leg swelling  GASTROINTESTINAL: No abdominal or epigastric pain. No nausea, vomiting, or hematemesis; No diarrhea or constipation. No melena or hematochezia.  GENITOURINARY: No dysuria, frequency, hematuria, or incontinence  NEUROLOGICAL: No headaches, memory loss, loss of strength, numbness, or tremors  SKIN: No itching, burning, rashes, or lesions   LYMPH NODES: No enlarged glands  ENDOCRINE: No heat or cold intolerance; No hair loss  MUSCULOSKELETAL: No joint pain or swelling; No muscle, back, or extremity pain  PSYCHIATRIC: No depression, anxiety, mood swings, or difficulty sleeping  HEME/LYMPH: No easy bruising, or bleeding gums  ALLERY AND IMMUNOLOGIC: No hives or eczema    Vital Signs Last 24 Hrs  T(C): 37.1 (2020 05:08), Max: 37.3 (2020 10:49)  T(F): 98.7 (2020 05:08), Max: 99.1 (2020 10:49)  HR: 70 (2020 08:35) (70 - 88)  BP: 113/59 (2020 05:08) (113/59 - 144/62)  BP(mean): --  RR: 16 (2020 05:08) (16 - 20)  SpO2: 100% (2020 08:35) (98% - 100%)    PHYSICAL EXAM:  GENERAL: NAD, well-groomed, well-developed/ demented  HEAD:  Atraumatic, Normocephalic  EYES: EOMI, PERRLA, conjunctiva and sclera clear  ENMT: No tonsillar erythema, exudates, or enlargement; Moist mucous membranes, Good dentition, No lesions  NECK: Supple, No JVD, Normal thyroid  NERVOUS SYSTEM:   dmented. Sleeping now.  CHEST/LUNG: Clear to percussion bilaterally; No rales, rhonchi, wheezing, or rubs  HEART: Regular rate and rhythm; Systolic murmurs all over precordium, rubs, or gallops  ABDOMEN: Soft, Nontender, Nondistended; Bowel sounds present  EXTREMITIES:  2+ Peripheral Pulses, No clubbing, cyanosis, or edema  LYMPH: No lymphadenopathy noted  SKIN: No rashes or lesions    LABS:                        10.2   10.17 )-----------( 305      ( 2020 08:17 )             32.2         136  |  96  |  54<H>  ----------------------------<  116<H>  3.6   |  31  |  1.08    Ca    8.5      2020 08:17    TPro  7.5  /  Alb  2.1<L>  /  TBili  0.4  /  DBili  x   /  AST  34  /  ALT  24  /  AlkPhos  88  02-07      PT/INR - ( 2020 23:34 )   PT: 11.4 sec;   INR: 1.02 ratio         PTT - ( 2020 23:34 )  PTT:27.5 sec  Urinalysis Basic - ( 2020 22:25 )    Color: Yellow / Appearance: Clear / S.015 / pH: x  Gluc: x / Ketone: Negative  / Bili: Negative / Urobili: Negative mg/dL   Blood: x / Protein: 30 mg/dL / Nitrite: Negative   Leuk Esterase: Trace / RBC: x / WBC 0-2   Sq Epi: x / Non Sq Epi: Occasional / Bacteria: Few      CAPILLARY BLOOD GLUCOSE        ABG - ( 2020 03:30 )  pH, Arterial: x     pH, Blood: 7.57  /  pCO2: 32    /  pO2: 82    / HCO3: 29    / Base Excess: 7.0   /  SaO2: 97                CARDIAC MARKERS ( 2020 08:46 )  1.850 ng/mL / x     / x     / x     / x      CARDIAC MARKERS ( 2020 21:29 )  2.110 ng/mL / x     / x     / x     / x                RADIOLOGY & ADDITIONAL TESTS:    Imaging Personally Reviewed:  [ ] YES  [ ] NO    Consultant(s) Notes Reviewed:  [ ] YES  [ ] NO    Care Discussed with Consultants/Other Providers [ ] YES  [ ] NO    Care discussed with family,         [  ]   yes  [  ]  No    imp:    stable[ ]    unstable[  ]     improving [x   ]       unchanged  [  ]                Plans:  Continue present plans  [x ] Pulmonary, cardiology follow up.               New consult [  ]   specialty  .......               order test[  ]    test name. labs in am                 Discharge Planning  [  ]

## 2020-02-09 NOTE — PROGRESS NOTE ADULT - SUBJECTIVE AND OBJECTIVE BOX
CARDIOLOGY CONSULTATION NOTE                                                                               AMILCAR GALVEZ is a 89y Female with a PMH of advanced dementia (nonverbal), CHF, HTN, AFib s/p PPM who presented to the ED in acute respiratory distress.  As per son reported to ED attending that she was discharged on oxygen however unsure if the oxygen was working.    Placed on BiPAP in the ED.  Labs show leukocytosis, elevated troponins and significantly elevated BNP.  Patient paced, +evidence of NSTEMI.    O/N no events, off Bipap, oxygenating adequately.    Home Medications:  allopurinol 100 mg oral tablet: 1 tab(s) orally once a day (15 Shahid 2016 05:47)  ALPRAZolam 0.25 mg oral tablet:  orally once a day in am (16 Nov 2017 13:14)  atorvastatin 10 mg oral tablet: 1 tab(s) orally once a day (at bedtime) (15 Shahid 2016 05:47)  calcium carbonate 600 mg oral tablet, chewable: 1 tab(s) orally once a day (15 Shahid 2016 05:47)  Cartia  mg/24 hours oral capsule, extended release: 1 cap(s) orally once a day in am   (16 Nov 2017 13:14)  digoxin 250 mcg (0.25 mg) oral tablet: 1 tab(s) orally once a day in am (16 Nov 2017 13:14)  multivitamin: 1 tab(s) orally once a day (15 Shahid 2016 05:47)  Namzaric 28 mg-10 mg oral capsule, extended release: 1 cap(s) orally once a day (15 Shahid 2016 05:47)  Vitamin D3 2000 intl units oral capsule: 1 cap(s) orally once a day (15 Shahid 2016 05:47)  Zetia 10 mg oral tablet: 1 tab(s) orally once a day (15 Shahid 2016 05:47)      MEDICATIONS  (STANDING):  atorvastatin 10 milliGRAM(s) Oral at bedtime  diltiazem    milliGRAM(s) Oral daily  donepezil 10 milliGRAM(s) Oral at bedtime  enoxaparin Injectable 40 milliGRAM(s) SubCutaneous daily  furosemide   Injectable 40 milliGRAM(s) IV Push two times a day  memantine 10 milliGRAM(s) Oral daily  piperacillin/tazobactam IVPB.. 3.375 Gram(s) IV Intermittent every 8 hours    ALLERGIES: No Known Allergies      FAMILY HISTORY: NC      PHYSICAL EXAMINATION:  -----------------------------  Vital Signs Last 24 Hrs  T(C): 35.8 (09 Feb 2020 11:27), Max: 37.1 (08 Feb 2020 23:27)  T(F): 96.4 (09 Feb 2020 11:27), Max: 98.8 (08 Feb 2020 23:27)  HR: 70 (09 Feb 2020 11:27) (70 - 88)  BP: 114/57 (09 Feb 2020 11:27) (113/59 - 124/68)  BP(mean): --  RR: 16 (09 Feb 2020 11:27) (16 - 16)  SpO2: 98% (09 Feb 2020 11:27) (98% - 100%)      Constitutional: well developed, normal appearance, well groomed, well nourished, no deformities and no acute distress.   Eyes: the conjunctiva exhibited no abnormalities and the eyelids demonstrated no xanthelasmas.   HEENT: normal oral mucosa, no oral pallor and no oral cyanosis.   Neck: normal jugular venous A waves present, normal jugular venous V waves present and no jugular venous mckeon A waves.   Pulmonary: no respiratory distress, normal respiratory rhythm and effort, no accessory muscle use and lungs were clear to auscultation bilaterally.   Cardiovascular: heart rate and rhythm were normal, normal S1 and S2 and no murmur, gallop, rub, heave or thrill are present.   Abdomen: soft, non-tender, no hepato-splenomegaly and no abdominal mass palpated.   Musculoskeletal: the gait could not be assessed..   Extremities: no clubbing of the fingernails, no localized cyanosis, no petechial hemorrhages and no ischemic changes.   Skin: normal skin color and pigmentation, no rash, no venous stasis, no skin lesions, no skin ulcer and no xanthoma was observed.   Psychiatric: sleeping. As per sons, she is at baseline.    ECG:  -------  < from: 12 Lead ECG (02.07.20 @ 21:24) >    Ventricular Rate 78 BPM    Atrial Rate 81 BPM    QRS Duration 110 ms    Q-T Interval 414 ms    QTC Calculation(Bezet) 471 ms    R Axis -43 degrees    T Axis 113 degrees    Diagnosis Line Atrial fibrillation with frequent ventricular-paced complexes  Left axis deviation  RSR' or QR pattern in V1 suggests right ventricular conduction delay  ST elevation, consider inferior injury or acute infarct  Consider right ventricular involvement in acute inferior infarct  Abnormal ECG  When compared with ECG of 02-FEB-2020 01:44,  Vent. rate has increased BY   8 BPM  Confirmed by Jorge Luis Lundberg MD ([id number]) on 2/8/2020 10:57:38 PM    < end of copied text >        LABS:   --------  02-09    136  |  96  |  54<H>  ----------------------------<  116<H>  3.6   |  31  |  1.08    Ca    8.5      09 Feb 2020 08:17    TPro  7.5  /  Alb  2.1<L>  /  TBili  0.4  /  DBili  x   /  AST  34  /  ALT  24  /  AlkPhos  88  02-07                          10.2   10.17 )-----------( 305      ( 09 Feb 2020 08:17 )             32.2                          12.1   15.93 )-----------( 333      ( 07 Feb 2020 21:29 )             38.3     PT/INR - ( 07 Feb 2020 23:34 )   PT: 11.4 sec;   INR: 1.02 ratio      CARDIAC MARKERS ( 08 Feb 2020 08:46 )  1.850 ng/mL / x     / x     / x     / x      CARDIAC MARKERS ( 07 Feb 2020 21:29 )  2.110 ng/mL / x     / x     / x     / x            RADIOLOGY REPORTS:  -----------------------------    < from: Xray Chest 1 View- PORTABLE-Urgent (02.07.20 @ 21:43) >    EXAM:  XR CHEST PORTABLE URGENT 1V                            PROCEDURE DATE:  02/07/2020          INTERPRETATION:  XR CHEST URGENT    Single AP view    HISTORY:  Shortness of breath    Comparison: Chest x-ray 2/1/2020      Left dual-lead pacer.    The cardiac silhouette is enlarged. Bibasilar opacities and perihilar opacities. Small bilateral pleural effusions and/or atelectasis.    IMPRESSION: Pulmonary edema versus multifocal pneumonia unchanged from 2/1/2020    SAVI LONG M.D.,ATTENDING RADIOLOGIST  This document has been electronically signed. Feb 8 2020 10:39AM      < end of copied text >      ECHOCARDIOGRAM:  ---------------------------  Last echo in office from 2016 showed normal left ventricular function with evidence of mild AS, MR, TR and moderate pulmonary hypertension. Awaiting repeat.

## 2020-02-09 NOTE — PROGRESS NOTE ADULT - ASSESSMENT
89-year-old female with advanced dementia, chronic atrial fibrillation, hypertension, hyperlipidemia.  Status post pacemaker.  Admitted again with evidence of acute respiratory distress with chest x-ray suggestive of vascular congestion and bilateral lung opacification and elevated BNP. positive troponins noted.    Impression:  ·	Acute on chronic HFpEF  ·	r/o NSTEMI  ·	Unresolved PNA  ·	Chronic afib  ·	Essential HTN  ·	HLD  ·	Advanced dementia    Plan:  -Cont. on IV Lasix, evidence of bibasilar rales and lower extremity edema.  -NSTEMI - conservative management given debility and dementia.  -Monitor electrolytes/renal function/daily weight  -Antibiotics as per medicine  -Not a candidate for aggressive therapeutic measures given advanced dementia and debility. Pt is DNR. Since no planned cath, not anticoagulating. Continue Rx with ASA, statin. Recheck TTE to assess for HFrEF. Start bbl instead of CACB's.

## 2020-02-09 NOTE — CONSULT NOTE ADULT - SUBJECTIVE AND OBJECTIVE BOX
HPI:  Patient is an 88 YO F with a PMH of advance dementia (nonverbal), CHF, HTN, AFib s/p PPM who presents to ED in respiratory distress.  Patient unable to provide history.  Son reported to ED attending tatc she was discharged on oxygen however unsure if the oxygen was working.    Placed on BiPAP in the ED.  Labs show leukocytosis, tropinemia, and significantly elevated BNP.  ED attending discussed case with Dr Lundberg who recommended no anticoagulation for now. Will admit to tele. (2020 02:45)      PAST MEDICAL & SURGICAL HISTORY:  Cardiac pacemaker  HTN (hypertension)  Alzheimer's dementia  LOLY (iron deficiency anemia)  Gout  Anxiety  Macular degeneration  Afib: was on coumadin until early , was discontinue d/t fall rsks per son ( lucila )  Head trauma: 2 subdural hematomas 2015  Cataract: left eye  Cholecystitis  Rotator cuff arthropathy: left shoulder  Uterine fibroid  Anemia  Infected hand  CA - breast cancer  Gout  High cholesterol  Hypertension  S/P cataract surgery, right:   S/P mastectomy, left:   S/P cataract surgery, left:   S/P knee replacement:   S/P cholecystectomy:   S/P rotator cuff repair:  - left  S/P hysterectomy: partial 1992  History of cholecystectomy  History of knee replacement procedure of right knee  H/O total hysterectomy  History of total mastectomy of right breast  Gallbladder & bile duct stone      SOCHX:   tobacco,  -  alcohol    FMHX: FA/MO  - contributory       Recent Travel:    Immunizations:    Allergies    No Known Allergies    Intolerances        MEDICATIONS  (STANDING):  atorvastatin 10 milliGRAM(s) Oral at bedtime  diltiazem    milliGRAM(s) Oral daily  donepezil 10 milliGRAM(s) Oral at bedtime  enoxaparin Injectable 40 milliGRAM(s) SubCutaneous daily  furosemide   Injectable 40 milliGRAM(s) IV Push two times a day  memantine 10 milliGRAM(s) Oral daily  piperacillin/tazobactam IVPB.. 3.375 Gram(s) IV Intermittent every 8 hours    MEDICATIONS  (PRN):      REVIEW OF SYSTEMS:  CONSTITUTIONAL: No fever, weight loss, or fatigue  EYES: No eye pain, visual disturbances, or discharge  ENMT:  No difficulty hearing, tinnitus, vertigo; No sinus or throat pain  NECK: No pain or stiffness  BREASTS: No pain, masses, or nipple discharge  RESPIRATORY: No cough, wheezing, chills or hemoptysis; No shortness of breath  CARDIOVASCULAR: No chest pain, palpitations, dizziness, or leg swelling  GASTROINTESTINAL: No abdominal or epigastric pain. No nausea, vomiting, or hematemesis; No diarrhea or constipation. No melena or hematochezia.  GENITOURINARY: No dysuria, frequency, hematuria, or incontinence  NEUROLOGICAL: No headaches, memory loss, loss of strength, numbness, or tremors  SKIN: No itching, burning, rashes, or lesions   LYMPH NODES: No enlarged glands  ENDOCRINE: No heat or cold intolerance; No hair loss  MUSCULOSKELETAL: No joint pain or swelling; No muscle, back, or extremity pain  PSYCHIATRIC: No depression, anxiety, mood swings, or difficulty sleeping  HEME/LYMPH: No easy bruising, or bleeding gums  ALLERGY AND IMMUNOLOGIC: No hives or eczema    VITAL SIGNS:    T(C): 36.2 (20 @ 17:36), Max: 37.1 (20 @ 23:27)  T(F): 97.1 (20 @ 17:36), Max: 98.8 (20 @ 23:27)  HR: 72 (20 @ 17:36) (70 - 88)  BP: 142/58 (20 @ 17:36) (113/59 - 142/58)  RR: 18 (20 @ 17:36) (16 - 18)  SpO2: 97% (20 @ 17:36) (97% - 100%)    PHYSICAL EXAM:    GENERAL: NAD, well-groomed, well-developed  HEAD:  Atraumatic, Normocephalic  EYES: EOMI, PERRLA, conjunctiva and sclera clear  ENMT: No tonsillar erythema, exudates, or enlargement; Moist mucous membranes, Good dentition, No lesions  NECK: Supple, No JVD, Normal thyroid  NERVOUS SYSTEM:  Alert & Oriented X3, Good concentration; Motor Strength 5/5 B/L upper and lower extremities; DTRs 2+ intact and symmetric  CHEST/LUNG: Clear to auscultation bilaterally; No rales, rhonchi, wheezing bilaterally  HEART: Regular rate and rhythm; No murmurs, rubs, or gallops  ABDOMEN: Soft, Nontender, Nondistended; Bowel sounds present  EXTREMITIES:  2+ Peripheral Pulses, No clubbing, cyanosis, or edema  LYMPH: No lymphadenopathy noted  SKIN: No rashes or lesions  BACK: no pressor sore     LABS:                         10.2   10. )-----------( 305      ( 2020 08:17 )             32.2         136  |  96  |  54<H>  ----------------------------<  116<H>  3.6   |  31  |  1.08    Ca    8.5      2020 08:17    TPro  7.5  /  Alb  2.1<L>  /  TBili  0.4  /  DBili  x   /  AST  34  /  ALT  24  /  AlkPhos  88      LIVER FUNCTIONS - ( 2020 21:29 )  Alb: 2.1 g/dL / Pro: 7.5 gm/dL / ALK PHOS: 88 U/L / ALT: 24 U/L / AST: 34 U/L / GGT: x           PT/INR - ( 2020 23:34 )   PT: 11.4 sec;   INR: 1.02 ratio         PTT - ( 2020 23:34 )  PTT:27.5 sec  Urinalysis Basic - ( 2020 22:25 )    Color: Yellow / Appearance: Clear / S.015 / pH: x  Gluc: x / Ketone: Negative  / Bili: Negative / Urobili: Negative mg/dL   Blood: x / Protein: 30 mg/dL / Nitrite: Negative   Leuk Esterase: Trace / RBC: x / WBC 0-2   Sq Epi: x / Non Sq Epi: Occasional / Bacteria: Few      ABG - ( 2020 03:30 )  pH, Arterial: x     pH, Blood: 7.57  /  pCO2: 32    /  pO2: 82    / HCO3: 29    / Base Excess: 7.0   /  SaO2: 97                CARDIAC MARKERS ( 2020 08:46 )  1.850 ng/mL / x     / x     / x     / x      CARDIAC MARKERS ( 2020 21:29 )  2.110 ng/mL / x     / x     / x     / x                        Culture Results:   No growth ( @ 11:19)  Culture Results:   No growth to date. ( @ 11:05)  Culture Results:   No growth to date. ( @ 11:05)                Radiology: HPI:  Patient is an 88 YO F with a PMH of advance dementia (nonverbal), CHF, HTN, AFib s/p PPM who presents to ED in respiratory distress.  Patient unable to provide history.  Son reported to ED attending clifford she was discharged on oxygen however unsure if the oxygen was working.    Placed on BiPAP in the ED.  Labs show leukocytosis, tropinemia, and significantly elevated BNP.  ED attending discussed case with Dr Lundberg who recommended no anticoagulation for now. Will admit to tele. (2020 02:45)  patient is well known to me from prev admission    PAST MEDICAL & SURGICAL HISTORY:  Cardiac pacemaker  HTN (hypertension)  Alzheimer's dementia  LOLY (iron deficiency anemia)  Gout  Anxiety  Macular degeneration  Afib: was on coumadin until early , was discontinue d/t fall rsks per son ( lucila )  Head trauma: 2 subdural hematomas 2015  Cataract: left eye  Cholecystitis  Rotator cuff arthropathy: left shoulder  Uterine fibroid  Anemia  Infected hand  CA - breast cancer  Gout  High cholesterol  Hypertension  S/P cataract surgery, right:   S/P mastectomy, left:   S/P cataract surgery, left:   S/P knee replacement: 2003  S/P cholecystectomy:   S/P rotator cuff repair:  - left  S/P hysterectomy: partial 1992  History of cholecystectomy  History of knee replacement procedure of right knee  H/O total hysterectomy  History of total mastectomy of right breast  Gallbladder & bile duct stone      SOCHX:  no    tobacco,  -  alcohol    FMHX: FA/MO  - contributory       Recent Travel:    Immunizations:    Allergies    No Known Allergies    Intolerances        MEDICATIONS  (STANDING):  atorvastatin 10 milliGRAM(s) Oral at bedtime  diltiazem    milliGRAM(s) Oral daily  donepezil 10 milliGRAM(s) Oral at bedtime  enoxaparin Injectable 40 milliGRAM(s) SubCutaneous daily  furosemide   Injectable 40 milliGRAM(s) IV Push two times a day  memantine 10 milliGRAM(s) Oral daily  piperacillin/tazobactam IVPB.. 3.375 Gram(s) IV Intermittent every 8 hours    MEDICATIONS  (PRN):      unable to obtain       VITAL SIGNS:    T(C): 36.2 (20 @ 17:36), Max: 37.1 (20 @ 23:27)  T(F): 97.1 (20 @ 17:36), Max: 98.8 (20 @ 23:27)  HR: 72 (20 @ 17:36) (70 - 88)  BP: 142/58 (20 @ 17:36) (113/59 - 142/58)  RR: 18 (20 @ 17:36) (16 - 18)  SpO2: 97% (20 @ 17:36) (97% - 100%)    PHYSICAL EXAM:    GENERAL: NAD, thin cachectic non verbal in nad now  HEAD:  Atraumatic, Normocephalic  EYES: EOMI, PERRLA, conjunctiva and sclera clear  ENMT: ; Moist mucous membranes,  NECK: Supple, No JVD, Normal thyroid  NERVOUS SYSTEM:  Alert & responsive   not verbalizing   CHEST/LUNG: decreased  to auscultation bilaterally; rhonchi bilateral   HEART: Regular rate and rhythm; No murmurs, rubs, or gallops  ABDOMEN: Soft, Nontender, Nondistended; Bowel sounds present  EXTREMITIES:  2+ Peripheral Pulses, No clubbing, cyanosis, or edema  LYMPH: No lymphadenopathy noted  SKIN: No rashes or lesions  BACK: no pressor sore     LABS:                         10.2   10 )-----------( 305      ( 2020 08:17 )             32.2         136  |  96  |  54<H>  ----------------------------<  116<H>  3.6   |  31  |  1.08    Ca    8.5      2020 08:17    TPro  7.5  /  Alb  2.1<L>  /  TBili  0.4  /  DBili  x   /  AST  34  /  ALT  24  /  AlkPhos  88  02-07    LIVER FUNCTIONS - ( 2020 21:29 )  Alb: 2.1 g/dL / Pro: 7.5 gm/dL / ALK PHOS: 88 U/L / ALT: 24 U/L / AST: 34 U/L / GGT: x           PT/INR - ( 2020 23:34 )   PT: 11.4 sec;   INR: 1.02 ratio         PTT - ( 2020 23:34 )  PTT:27.5 sec  Urinalysis Basic - ( 2020 22:25 )    Color: Yellow / Appearance: Clear / S.015 / pH: x  Gluc: x / Ketone: Negative  / Bili: Negative / Urobili: Negative mg/dL   Blood: x / Protein: 30 mg/dL / Nitrite: Negative   Leuk Esterase: Trace / RBC: x / WBC 0-2   Sq Epi: x / Non Sq Epi: Occasional / Bacteria: Few      ABG - ( 2020 03:30 )  pH, Arterial: x     pH, Blood: 7.57  /  pCO2: 32    /  pO2: 82    / HCO3: 29    / Base Excess: 7.0   /  SaO2: 97                CARDIAC MARKERS ( 2020 08:46 )  1.850 ng/mL / x     / x     / x     / x      CARDIAC MARKERS ( 2020 21:29 )  2.110 ng/mL / x     / x     / x     / x                        Culture Results:   No growth ( @ 11:19)  Culture Results:   No growth to date. ( @ 11:05)  Culture Results:   No growth to date. ( @ 11:05)                Radiology:    < from: Xray Chest 1 View- PORTABLE-Urgent (20 @ 21:43) >    EXAM:  XR CHEST PORTABLE URGENT 1V                            PROCEDURE DATE:  2020          INTERPRETATION:  XR CHEST URGENT    Single AP view    HISTORY:  Shortness of breath    Comparison: Chest x-ray 2020      Left dual-lead pacer.    The cardiac silhouette is enlarged. Bibasilar opacities and perihilar opacities. Small bilateral pleural effusions and/or atelectasis.    IMPRESSION: Pulmonary edema versus multifocal pneumonia unchanged from 2020                SAVI LONG M.D.,ATTENDING RADIOLOGIST  This document has been electronically signed. 2020 10:39AM                < end of copied text >

## 2020-02-10 LAB
ANION GAP SERPL CALC-SCNC: 8 MMOL/L — SIGNIFICANT CHANGE UP (ref 5–17)
BUN SERPL-MCNC: 54 MG/DL — HIGH (ref 7–23)
CALCIUM SERPL-MCNC: 8.4 MG/DL — LOW (ref 8.5–10.1)
CHLORIDE SERPL-SCNC: 95 MMOL/L — LOW (ref 96–108)
CO2 SERPL-SCNC: 34 MMOL/L — HIGH (ref 22–31)
CREAT SERPL-MCNC: 1.08 MG/DL — SIGNIFICANT CHANGE UP (ref 0.5–1.3)
GLUCOSE SERPL-MCNC: 97 MG/DL — SIGNIFICANT CHANGE UP (ref 70–99)
HCT VFR BLD CALC: 32.8 % — LOW (ref 34.5–45)
HGB BLD-MCNC: 10.4 G/DL — LOW (ref 11.5–15.5)
MCHC RBC-ENTMCNC: 29.1 PG — SIGNIFICANT CHANGE UP (ref 27–34)
MCHC RBC-ENTMCNC: 31.7 GM/DL — LOW (ref 32–36)
MCV RBC AUTO: 91.6 FL — SIGNIFICANT CHANGE UP (ref 80–100)
NRBC # BLD: 0 /100 WBCS — SIGNIFICANT CHANGE UP (ref 0–0)
PLATELET # BLD AUTO: 311 K/UL — SIGNIFICANT CHANGE UP (ref 150–400)
POTASSIUM SERPL-MCNC: 3.4 MMOL/L — LOW (ref 3.5–5.3)
POTASSIUM SERPL-SCNC: 3.4 MMOL/L — LOW (ref 3.5–5.3)
RBC # BLD: 3.58 M/UL — LOW (ref 3.8–5.2)
RBC # FLD: 14.6 % — HIGH (ref 10.3–14.5)
SODIUM SERPL-SCNC: 137 MMOL/L — SIGNIFICANT CHANGE UP (ref 135–145)
WBC # BLD: 10.31 K/UL — SIGNIFICANT CHANGE UP (ref 3.8–10.5)
WBC # FLD AUTO: 10.31 K/UL — SIGNIFICANT CHANGE UP (ref 3.8–10.5)

## 2020-02-10 PROCEDURE — 93306 TTE W/DOPPLER COMPLETE: CPT | Mod: 26

## 2020-02-10 PROCEDURE — 99232 SBSQ HOSP IP/OBS MODERATE 35: CPT

## 2020-02-10 RX ORDER — ASPIRIN/CALCIUM CARB/MAGNESIUM 324 MG
81 TABLET ORAL DAILY
Refills: 0 | Status: DISCONTINUED | OUTPATIENT
Start: 2020-02-10 | End: 2020-02-14

## 2020-02-10 RX ORDER — POTASSIUM CHLORIDE 20 MEQ
40 PACKET (EA) ORAL ONCE
Refills: 0 | Status: COMPLETED | OUTPATIENT
Start: 2020-02-10 | End: 2020-02-10

## 2020-02-10 RX ADMIN — MEMANTINE HYDROCHLORIDE 10 MILLIGRAM(S): 10 TABLET ORAL at 11:52

## 2020-02-10 RX ADMIN — Medication 40 MILLIGRAM(S): at 05:23

## 2020-02-10 RX ADMIN — ENOXAPARIN SODIUM 40 MILLIGRAM(S): 100 INJECTION SUBCUTANEOUS at 11:52

## 2020-02-10 RX ADMIN — Medication 40 MILLIGRAM(S): at 18:15

## 2020-02-10 RX ADMIN — Medication 240 MILLIGRAM(S): at 05:23

## 2020-02-10 RX ADMIN — DONEPEZIL HYDROCHLORIDE 10 MILLIGRAM(S): 10 TABLET, FILM COATED ORAL at 21:19

## 2020-02-10 RX ADMIN — ATORVASTATIN CALCIUM 10 MILLIGRAM(S): 80 TABLET, FILM COATED ORAL at 21:19

## 2020-02-10 RX ADMIN — Medication 40 MILLIEQUIVALENT(S): at 21:19

## 2020-02-10 RX ADMIN — PIPERACILLIN AND TAZOBACTAM 25 GRAM(S): 4; .5 INJECTION, POWDER, LYOPHILIZED, FOR SOLUTION INTRAVENOUS at 14:37

## 2020-02-10 RX ADMIN — PIPERACILLIN AND TAZOBACTAM 25 GRAM(S): 4; .5 INJECTION, POWDER, LYOPHILIZED, FOR SOLUTION INTRAVENOUS at 21:18

## 2020-02-10 RX ADMIN — PIPERACILLIN AND TAZOBACTAM 25 GRAM(S): 4; .5 INJECTION, POWDER, LYOPHILIZED, FOR SOLUTION INTRAVENOUS at 05:23

## 2020-02-10 NOTE — PROGRESS NOTE ADULT - ASSESSMENT
89 y/o F with a PMHx of advanced dementia (nonverbal), CHF, HTN, AFib s/p PPM admitted to tele for respiratory distress requiring BiPAP w/ ?PNA/ B/L pleural effusions. Labs significant for leukocytosis, tropinemia, and elevated BNP   patient was just discharge on po vantin and zithromax on feb 5   -Leukocytosis secondary to possible PNA/UTI, vas stress response  has responded to zosyn   finish 5-7 days   not in distress on exam on NC  aid at bedside   leukocytosis and procalcitonin improving

## 2020-02-10 NOTE — PROGRESS NOTE ADULT - SUBJECTIVE AND OBJECTIVE BOX
Patient is a 89y old  Female who presents with a chief complaint of respiratory distress (10 Feb 2020 11:41)    PAST MEDICAL & SURGICAL HISTORY:  Cardiac pacemaker  HTN (hypertension)  Alzheimer's dementia  LOLY (iron deficiency anemia)  Gout  Anxiety  Macular degeneration  Afib: was on coumadin until early 2015, was discontinue d/t fall rsks per son ( lucila )  Head trauma: 2 subdural hematomas 5/2015  Cataract: left eye  Cholecystitis  Rotator cuff arthropathy: left shoulder  Uterine fibroid  Anemia  Infected hand  CA - breast cancer  Gout  High cholesterol  Hypertension  S/P cataract surgery, right: 2006  S/P mastectomy, left: 2004  S/P cataract surgery, left: 2003  S/P knee replacement: 2003  S/P cholecystectomy: 2002  S/P rotator cuff repair: 2002 - left  S/P hysterectomy: partial 1992  History of cholecystectomy  History of knee replacement procedure of right knee  H/O total hysterectomy  History of total mastectomy of right breast  Gallbladder & bile duct stone    INTERVAL HISTORY:  	  MEDICATIONS:  MEDICATIONS  (STANDING):  atorvastatin 10 milliGRAM(s) Oral at bedtime  diltiazem    milliGRAM(s) Oral daily  donepezil 10 milliGRAM(s) Oral at bedtime  enoxaparin Injectable 40 milliGRAM(s) SubCutaneous daily  furosemide   Injectable 40 milliGRAM(s) IV Push two times a day  memantine 10 milliGRAM(s) Oral daily  piperacillin/tazobactam IVPB.. 3.375 Gram(s) IV Intermittent every 8 hours    Vitals:  T(F): 98 (02-10-20 @ 11:05), Max: 98 (02-10-20 @ 11:05)  HR: 77 (02-10-20 @ 11:05) (68 - 77)  BP: 123/66 (02-10-20 @ 11:05) (123/66 - 142/58)  RR: 17 (02-10-20 @ 11:05) (17 - 18)  SpO2: 100% (02-10-20 @ 11:05) (97% - 100%)  Wt(kg): --49.9 kg    02-09 @ 07:01  -  02-10 @ 07:00  --------------------------------------------------------  IN:    Oral Fluid: 354 mL    Solution: 200 mL  Total IN: 554 mL    OUT:  Total OUT: 0 mL    Total NET: 554 mL      02-10 @ 07:01  -  02-10 @ 14:30  --------------------------------------------------------  IN:    Oral Fluid: 240 mL  Total IN: 240 mL    OUT:  Total OUT: 0 mL    Total NET: 240 mL    Weight (kg): 49.9 (02-07 @ 20:15)  BMI (kg/m2): 21.5 (02-07 @ 20:15)    PHYSICAL EXAM:  Neuro: Awake, responsive  CV: S1 S2 irregular, + SM   Lungs: CTABL  GI: Soft, BS +, ND, NT  Extremities: No edema    RADIOLOGY: < from: Xray Chest 1 View- PORTABLE-Urgent (02.07.20 @ 21:43) >    Left dual-lead pacer.    The cardiac silhouette is enlarged. Bibasilar opacities and perihilar opacities. Small bilateral pleural effusions and/or atelectasis.    IMPRESSION: Pulmonary edema versus multifocal pneumonia unchanged from 2/1/2020    < end of copied text >    DIAGNOSTIC TESTING:    [x ] Echocardiogram: < from: TTE Echo Doppler w/o Cont (05.10.13 @ 13:27) >   1. Left ventricular ejection fraction, by visual estimation, is 45 to   50%.        2. There is mild septal left ventricular hypertrophy.                       3. Mildly dilated right atrium.                                             4. There isno evidence of pericardial effusion.                            5. Mild mitral annular calcification.                                       6. Moderate mitral valve regurgitation.                                     7. Thickening and calcification ofthe anterior and posterior mitral   valve       leaflets.                                                                    8. Mild aortic regurgitation.                                               9. Sclerotic aortic valve with normal opening.                         10. Mildly enlarged left atrium.                                            11. Peak transaortic gradient equals 16.2 mmHg, mean transaortic gradient   equals  9.3 mmHg, the calculated aortic valve area equals 1.82 cm2 by the  continuity      equation consistent with mild aortic stenosis.                              12. There is mild aortic root calcification.                                                                                                       < end of copied text >    LABS:	 	    CARDIAC MARKERS:  Troponin I, Serum: 1.850 ng/mL (02-08 @ 08:46)  Troponin I, Serum: 2.110 ng/mL (02-07 @ 21:29)    10 Feb 2020 06:36    137    |  95     |  54     ----------------------------<  97     3.4     |  34     |  1.08   09 Feb 2020 08:17    136    |  96     |  54     ----------------------------<  116    3.6     |  31     |  1.08   07 Feb 2020 21:29    134    |  100    |  50     ----------------------------<  135    5.0     |  25     |  0.95     Ca    8.4        10 Feb 2020 06:36                        10.4   10.31 )-----------( 311      ( 10 Feb 2020 06:36 )             32.8 ,                       10.2   10.17 )-----------( 305      ( 09 Feb 2020 08:17 )             32.2 ,                       12.1   15.93 )-----------( 333      ( 07 Feb 2020 21:29 )             38.3   proBNP: Serum Pro-Brain Natriuretic Peptide: 65804 pg/mL (02-07 @ 21:29)    INR: 1.02 ratio (02-07 @ 23:34) Patient is a 89y old  Female who presents with a chief complaint of respiratory distress (10 Feb 2020 11:41)    PAST MEDICAL & SURGICAL HISTORY:  Cardiac pacemaker  HTN (hypertension)  Alzheimer's dementia  LOLY (iron deficiency anemia)  Gout  Anxiety  Macular degeneration  Afib: was on coumadin until early 2015, was discontinue d/t fall rsks per son ( lucila )  Head trauma: 2 subdural hematomas 5/2015  Cataract: left eye  Cholecystitis  Rotator cuff arthropathy: left shoulder  Uterine fibroid  Anemia  Infected hand  CA - breast cancer  Gout  High cholesterol  Hypertension  S/P cataract surgery, right: 2006  S/P mastectomy, left: 2004  S/P cataract surgery, left: 2003  S/P knee replacement: 2003  S/P cholecystectomy: 2002  S/P rotator cuff repair: 2002 - left  S/P hysterectomy: partial 1992  History of cholecystectomy  History of knee replacement procedure of right knee  H/O total hysterectomy  History of total mastectomy of right breast  Gallbladder & bile duct stone    INTERVAL HISTORY: Resting in bed, not in any acute distress   	  MEDICATIONS:  MEDICATIONS  (STANDING):  atorvastatin 10 milliGRAM(s) Oral at bedtime  diltiazem    milliGRAM(s) Oral daily  donepezil 10 milliGRAM(s) Oral at bedtime  enoxaparin Injectable 40 milliGRAM(s) SubCutaneous daily  furosemide   Injectable 40 milliGRAM(s) IV Push two times a day  memantine 10 milliGRAM(s) Oral daily  piperacillin/tazobactam IVPB.. 3.375 Gram(s) IV Intermittent every 8 hours    Vitals:  T(F): 98 (02-10-20 @ 11:05), Max: 98 (02-10-20 @ 11:05)  HR: 77 (02-10-20 @ 11:05) (68 - 77)  BP: 123/66 (02-10-20 @ 11:05) (123/66 - 142/58)  RR: 17 (02-10-20 @ 11:05) (17 - 18)  SpO2: 100% (02-10-20 @ 11:05) (97% - 100%)  Wt(kg): --49.9 kg    02-09 @ 07:01  -  02-10 @ 07:00  --------------------------------------------------------  IN:    Oral Fluid: 354 mL    Solution: 200 mL  Total IN: 554 mL    OUT:  Total OUT: 0 mL    Total NET: 554 mL    02-10 @ 07:01  -  02-10 @ 14:30  --------------------------------------------------------  IN:    Oral Fluid: 240 mL  Total IN: 240 mL    OUT:  Total OUT: 0 mL    Total NET: 240 mL    Weight (kg): 49.9 (02-07 @ 20:15)  BMI (kg/m2): 21.5 (02-07 @ 20:15)    PHYSICAL EXAM:  Neuro: Awake, responsive  CV: S1 S2 irregular, + SM   Lungs: bibasilar rales   GI: Soft, BS +, ND, NT  Extremities: + upper/lower  edema    RADIOLOGY: < from: Xray Chest 1 View- PORTABLE-Urgent (02.07.20 @ 21:43) >    Left dual-lead pacer.    The cardiac silhouette is enlarged. Bibasilar opacities and perihilar opacities. Small bilateral pleural effusions and/or atelectasis.    IMPRESSION: Pulmonary edema versus multifocal pneumonia unchanged from 2/1/2020    < end of copied text >    DIAGNOSTIC TESTING:    [x ] Echocardiogram: < from: TTE Echo Doppler w/o Cont (05.10.13 @ 13:27) >   1. Left ventricular ejection fraction, by visual estimation, is 45 to   50%.        2. There is mild septal left ventricular hypertrophy.                       3. Mildly dilated right atrium.                                             4. There isno evidence of pericardial effusion.                            5. Mild mitral annular calcification.                                       6. Moderate mitral valve regurgitation.                                     7. Thickening and calcification ofthe anterior and posterior mitral   valve       leaflets.                                                                    8. Mild aortic regurgitation.                                               9. Sclerotic aortic valve with normal opening.                         10. Mildly enlarged left atrium.                                            11. Peak transaortic gradient equals 16.2 mmHg, mean transaortic gradient   equals  9.3 mmHg, the calculated aortic valve area equals 1.82 cm2 by the  continuity      equation consistent with mild aortic stenosis.                              12. There is mild aortic root calcification.                                                                                                       < end of copied text >    LABS:	 	    CARDIAC MARKERS:  Troponin I, Serum: 1.850 ng/mL (02-08 @ 08:46)  Troponin I, Serum: 2.110 ng/mL (02-07 @ 21:29)    10 Feb 2020 06:36    137    |  95     |  54     ----------------------------<  97     3.4     |  34     |  1.08   09 Feb 2020 08:17    136    |  96     |  54     ----------------------------<  116    3.6     |  31     |  1.08   07 Feb 2020 21:29    134    |  100    |  50     ----------------------------<  135    5.0     |  25     |  0.95     Ca    8.4        10 Feb 2020 06:36                        10.4   10.31 )-----------( 311      ( 10 Feb 2020 06:36 )             32.8 ,                       10.2   10.17 )-----------( 305      ( 09 Feb 2020 08:17 )             32.2 ,                       12.1   15.93 )-----------( 333      ( 07 Feb 2020 21:29 )             38.3   proBNP: Serum Pro-Brain Natriuretic Peptide: 49810 pg/mL (02-07 @ 21:29)    INR: 1.02 ratio (02-07 @ 23:34)

## 2020-02-10 NOTE — PROGRESS NOTE ADULT - ATTENDING COMMENTS
Conservative management given advanced age and debility.  Patient does not appear to be volume overloaded at this time.  Can be discharged from cardiovascular standpoint once cleared by medicine/infectious disease.  Case management was discussed at length with patient son at bedside.

## 2020-02-10 NOTE — PROGRESS NOTE ADULT - SUBJECTIVE AND OBJECTIVE BOX
Patient is a 89y old  Female who presents with a chief complaint of respiratory distress (09 Feb 2020 17:56)  much improved. No respiratory distress.   no fever.   Vitals stable      cardiolgy, id and Pul notes  appreciated    INTERVAL HPI/OVERNIGHT EVENTS: un eventful  PAST MEDICAL & SURGICAL HISTORY:  Cardiac pacemaker  HTN (hypertension)  Alzheimer's dementia  LOLY (iron deficiency anemia)  Gout  Anxiety  Macular degeneration  Afib: was on coumadin until early 2015, was discontinue d/t fall rsks per son ( lucila )  Head trauma: 2 subdural hematomas 5/2015  Cataract: left eye  Cholecystitis  Rotator cuff arthropathy: left shoulder  Uterine fibroid  Anemia  Infected hand  CA - breast cancer  Gout  High cholesterol  Hypertension  S/P cataract surgery, right: 2006  S/P mastectomy, left: 2004  S/P cataract surgery, left: 2003  S/P knee replacement: 2003  S/P cholecystectomy: 2002  S/P rotator cuff repair: 2002 - left  S/P hysterectomy: partial 1992  History of cholecystectomy  History of knee replacement procedure of right knee  H/O total hysterectomy  History of total mastectomy of right breast  Gallbladder & bile duct stone      MEDICATIONS  (STANDING):  atorvastatin 10 milliGRAM(s) Oral at bedtime  diltiazem    milliGRAM(s) Oral daily  donepezil 10 milliGRAM(s) Oral at bedtime  enoxaparin Injectable 40 milliGRAM(s) SubCutaneous daily  furosemide   Injectable 40 milliGRAM(s) IV Push two times a day  memantine 10 milliGRAM(s) Oral daily  piperacillin/tazobactam IVPB.. 3.375 Gram(s) IV Intermittent every 8 hours    MEDICATIONS  (PRN):      Allergies    No Known Allergies    Intolerances        REVIEW OF SYSTEMS:  CONSTITUTIONAL: No fever, weight loss, or fatigue  EYES: No eye pain, visual disturbances, or discharge  ENMT:  No difficulty hearing, tinnitus, vertigo; No sinus or throat pain  NECK: No pain or stiffness  BREASTS: No pain, masses, or nipple discharge  RESPIRATORY: No cough, wheezing, chills or hemoptysis; No shortness of breath  CARDIOVASCULAR: No chest pain, palpitations, dizziness, or leg swelling  GASTROINTESTINAL: No abdominal or epigastric pain. No nausea, vomiting, or hematemesis; No diarrhea or constipation. No melena or hematochezia.  GENITOURINARY: No dysuria, frequency, hematuria, or incontinence  NEUROLOGICAL: No headaches, memory loss, loss of strength, numbness, or tremors  SKIN: No itching, burning, rashes, or lesions   LYMPH NODES: No enlarged glands  ENDOCRINE: No heat or cold intolerance; No hair loss  MUSCULOSKELETAL: No joint pain or swelling; No muscle, back, or extremity pain  PSYCHIATRIC: No depression, anxiety, mood swings, or difficulty sleeping  HEME/LYMPH: No easy bruising, or bleeding gums  ALLERY AND IMMUNOLOGIC: No hives or eczema    Vital Signs Last 24 Hrs  T(C): 36.3 (10 Feb 2020 04:28), Max: 36.3 (10 Feb 2020 04:28)  T(F): 97.3 (10 Feb 2020 04:28), Max: 97.3 (10 Feb 2020 04:28)  HR: 70 (10 Feb 2020 09:25) (68 - 72)  BP: 133/86 (10 Feb 2020 04:28) (133/86 - 142/58)  BP(mean): --  RR: 18 (10 Feb 2020 04:28) (18 - 18)  SpO2: 98% (10 Feb 2020 09:25) (97% - 100%)    PHYSICAL EXAM:  GENERAL: NAD, well-groomed, well-developed  HEAD:  Atraumatic, Normocephalic  EYES: EOMI, PERRLA, conjunctiva and sclera clear  ENMT: No tonsillar erythema, exudates, or enlargement; Moist mucous membranes, Good dentition, No lesions  NECK: Supple, No JVD, Normal thyroid  NERVOUS SYSTEM:  Alert & Oriented X3, Good concentration; Motor Strength 5/5 B/L upper and lower extremities; DTRs 2+ intact and symmetric  CHEST/LUNG: Clear to percussion bilaterally; No rales, rhonchi, wheezing, or rubs  HEART: Regular rate and rhythm; syastolicmurmur all over the precordium, rubs, or gallops  ABDOMEN: Soft, Nontender, Nondistended; Bowel sounds present  EXTREMITIES:  2+ Peripheral Pulses, No clubbing, cyanosis, or edema  LYMPH: No lymphadenopathy noted  SKIN: No rashes or lesions    LABS:                        10.4   10.31 )-----------( 311      ( 10 Feb 2020 06:36 )             32.8     02-10    137  |  95<L>  |  54<H>  ----------------------------<  97  3.4<L>   |  34<H>  |  1.08    Ca    8.4<L>      10 Feb 2020 06:36            CAPILLARY BLOOD GLUCOSE                    RADIOLOGY & ADDITIONAL TESTS:    Imaging Personally Reviewed:  [ ] YES  [ ] NO    Consultant(s) Notes Reviewed:  [ ] YES  [ ] NO    Care Discussed with Consultants/Other Providers [ ] YES  [ ] NO    Care discussed with family,         [  ]   yes  [  ]  No    imp:    stable[ ]    unstable[  ]     improving [  x ]       unchanged  [  ]                Plans:  Continue present plans  [ x ] as per cardiology and Pulmonary- palliative care only. , son in agreement               New consult [  ]   specialty  .......               order test[  ]    test name.                  Discharge Planning  [  ]

## 2020-02-10 NOTE — PROGRESS NOTE ADULT - SUBJECTIVE AND OBJECTIVE BOX
INTERVAL HPI:  89 YO F with Advance dementia (nonverbal), CHF, HTN, AFib s/p PPM who presents to ED in Acute  Respiratory distress.  Son reported to ED attending clifford she was discharged on oxygen however unsure if the oxygen was working.   Placed on BiPAP in the ED.  Labs show leukocytosis, Elevated troponin, and significantly elevated BNP.   Pt not able to provide any history,    OVERNIGHT EVENTS:  Resting comfortably.    Vital Signs Last 24 Hrs  T(C): 36.2 (10 Feb 2020 16:25), Max: 36.7 (10 Feb 2020 11:05)  T(F): 97.2 (10 Feb 2020 16:25), Max: 98 (10 Feb 2020 11:05)  HR: 70 (10 Feb 2020 16:25) (68 - 77)  BP: 111/75 (10 Feb 2020 16:25) (111/75 - 139/69)  BP(mean): --  RR: 18 (10 Feb 2020 16:25) (17 - 18)  SpO2: 100% (10 Feb 2020 16:25) (97% - 100%)  PHYSICAL EXAM:  GEN:         comfortable.  HEENT:    Normal.    RESP:        no wheezing  CVS:           Regular rate and rhythm.   ABD:         Soft, non-tender, non-distended;     MEDICATIONS  (STANDING):  aspirin  chewable 81 milliGRAM(s) Oral daily  atorvastatin 10 milliGRAM(s) Oral at bedtime  diltiazem    milliGRAM(s) Oral daily  donepezil 10 milliGRAM(s) Oral at bedtime  enoxaparin Injectable 40 milliGRAM(s) SubCutaneous daily  furosemide   Injectable 40 milliGRAM(s) IV Push two times a day  memantine 10 milliGRAM(s) Oral daily  piperacillin/tazobactam IVPB.. 3.375 Gram(s) IV Intermittent every 8 hours    LABS:                        10.4   10.31 )-----------( 311      ( 10 Feb 2020 06:36 )             32.8     02-10    137  |  95<L>  |  54<H>  ----------------------------<  97  3.4<L>   |  34<H>  |  1.08    Ca    8.4<L>      10 Feb 2020 06:36    02-08 @ 03:30  pH: 7.57  pCO2: 32  pO2: 82  SaO2: 97  02-07 @ 22:02  pH: 7.55  pCO2: 32  pO2: 85  SaO2: 97    89 YO F with Advance dementia (nonverbal), CHF, HTN, AFib s/p PPM who presents to ED in Acute  Respiratory distress.  Son reported to ED attending taht she was discharged on oxygen however unsure if the oxygen was working.   Placed on BiPAP in the ED.  Labs show leukocytosis, Elevated troponin, and significantly elevated BNP.   Pt not able to provide any history,    ASSESSMENT AND PLAN:  ·	Acute hypoxic Respiratory failure.  ·	Acute pulmonary edema.  ·	CHF Systolic/Diastolic.  ·	Bilateral pleural effusion.  ·	A Fib S/P PPM  ·	elevated troponin.  ·	Leukocytosis.  ·	Renal Insuffiencey.  ·	Advanced Dementia.    Continue diuretics.  Continue Empiric antibiotics.  Procalcitonin  is high.

## 2020-02-10 NOTE — PROGRESS NOTE ADULT - ASSESSMENT
89-year-old female with advanced dementia, chronic atrial fibrillation, hypertension, hyperlipidemia.  Status post pacemaker.  Admitted again with evidence of acute respiratory distress with chest x-ray suggestive of vascular congestion and bilateral lung opacification and elevated BNP. positive troponins noted.  BNP 40,000    Impression:  ·	Acute on chronic HF, awaiting echo  ·	NSTEMI  ·	PNA  ·	Chronic afib  ·	Essential HTN  ·	HLD  ·	Advanced dementia    Plan:  -Cont. on IV Lasix, evidence of bibasilar rales and lower extremity edema.   -NSTEMI - conservative management given debility and dementia.  -Monitor electrolytes/renal function/daily weight  -Antibiotics as per medicine  -Not a candidate for aggressive therapeutic measures given advanced dementia and debility. Pt is DNR. Since no planned cath, not anticoagulating. Continue Rx with ASA, statin. Recheck TTE to assess for HFrEF. Start bbl instead of CACB's. 89-year-old female with advanced dementia, chronic atrial fibrillation, hypertension, hyperlipidemia.  Status post pacemaker.  Admitted again with evidence of acute respiratory distress with chest x-ray suggestive of vascular congestion and bilateral lung opacification and elevated BNP. positive troponins noted.  BNP 40,000    Impression:  ·	Acute on chronic HF, awaiting echo  ·	NSTEMI  ·	PNA  ·	Chronic afib  ·	Essential HTN  ·	HLD  ·	Advanced dementia    Plan:  -Cont. on IV Lasix, evidence of bibasilar rales and lower extremity edema.   -NSTEMI - conservative management given debility and dementia.  -Monitor electrolytes/renal function/daily weight  -Antibiotics as per medicine  -Not a candidate for aggressive therapeutic measures given advanced dementia and debility. Pt is DNR. Since no planned cath, not anticoagulating. Continue Rx with ASA, statin. Recheck TTE to assess for HFrEF. will switch to bbl instead of CACB's if EF low. 89-year-old female with advanced dementia, chronic atrial fibrillation, hypertension, hyperlipidemia.  Status post pacemaker.  Admitted again with evidence of acute respiratory distress with chest x-ray suggestive of vascular congestion and bilateral lung opacification and elevated BNP. positive troponins noted.  BNP 40,000    Impression:  ·	Acute on chronic HF, awaiting echo  ·	NSTEMI  ·	PNA  ·	Chronic afib  ·	Essential HTN  ·	HLD  ·	Advanced dementia    Plan:  -Cont. on IV Lasix, evidence of bibasilar rales and lower extremity edema.   -NSTEMI - conservative management given debility and dementia.  -Cont asa/statin   -Monitor electrolytes/renal function/daily weight  -Antibiotics as per medicine/ID  -Not a candidate for aggressive therapeutic measures given advanced dementia and debility. Pt is DNR. Since no planned cath, not anticoagulating. Continue Rx with ASA, statin. Recheck TTE to assess for HFrEF. will switch to bbl instead of CACB's if EF low.

## 2020-02-10 NOTE — PROGRESS NOTE ADULT - SUBJECTIVE AND OBJECTIVE BOX
HPI:  Patient is an 88 YO F with a PMH of advance dementia (nonverbal), CHF, HTN, AFib s/p PPM who presents to ED in respiratory distress.  Patient unable to provide history.  Son reported to ED attending clifford she was discharged on oxygen however unsure if the oxygen was working.    Placed on BiPAP in the ED.  Labs show leukocytosis, tropinemia, and significantly elevated BNP.  ED attending discussed case with Dr Lundberg who recommended no anticoagulation for now. Will admit to tele. (08 Feb 2020 02:45)      Allergies    No Known Allergies    Intolerances        MEDICATIONS  (STANDING):  atorvastatin 10 milliGRAM(s) Oral at bedtime  diltiazem    milliGRAM(s) Oral daily  donepezil 10 milliGRAM(s) Oral at bedtime  enoxaparin Injectable 40 milliGRAM(s) SubCutaneous daily  furosemide   Injectable 40 milliGRAM(s) IV Push two times a day  memantine 10 milliGRAM(s) Oral daily  piperacillin/tazobactam IVPB.. 3.375 Gram(s) IV Intermittent every 8 hours      REVIEW OF SYSTEMS: cannot communicate       VITAL SIGNS:  T(C): 36.7 (02-10-20 @ 11:05), Max: 36.7 (02-10-20 @ 11:05)  T(F): 98 (02-10-20 @ 11:05), Max: 98 (02-10-20 @ 11:05)  HR: 77 (02-10-20 @ 11:05) (68 - 77)  BP: 123/66 (02-10-20 @ 11:05) (123/66 - 142/58)  RR: 17 (02-10-20 @ 11:05) (17 - 18)  SpO2: 100% (02-10-20 @ 11:05) (97% - 100%)  Wt(kg): --    PHYSICAL EXAM:    GENERAL: not in any distress, lethargic on NC   HEENT: Neck is supple, normocephalic, atraumatic   CHEST/LUNG: Clear to percussion bilaterally; No rales, rhonchi, wheezing  HEART: Regular rate and rhythm; No murmurs, rubs, or gallops  ABDOMEN: Soft, Nontender, Nondistended; Bowel sounds present, no rebound   EXTREMITIES:  2+ Peripheral Pulses, No clubbing, cyanosis, or edema  GENITOURINARY:   SKIN: No rashes or lesions  BACK: no pressor sore   NERVOUS SYSTEM:  arousable, lethargic and demented     LABS:                         10.4   10.31 )-----------( 311      ( 10 Feb 2020 06:36 )             32.8     02-10    137  |  95<L>  |  54<H>  ----------------------------<  97  3.4<L>   |  34<H>  |  1.08    Ca    8.4<L>      10 Feb 2020 06:36                                Culture Results:   No growth (02-08 @ 11:19)  Culture Results:   No growth to date. (02-08 @ 11:05)  Culture Results:   No growth to date. (02-08 @ 11:05)                Radiology:

## 2020-02-11 LAB
PROCALCITONIN SERPL-MCNC: 0.35 NG/ML — HIGH (ref 0.02–0.1)
PROCALCITONIN SERPL-MCNC: 0.39 NG/ML — HIGH (ref 0.02–0.1)

## 2020-02-11 PROCEDURE — 99232 SBSQ HOSP IP/OBS MODERATE 35: CPT

## 2020-02-11 RX ORDER — METOPROLOL TARTRATE 50 MG
12.5 TABLET ORAL
Refills: 0 | Status: DISCONTINUED | OUTPATIENT
Start: 2020-02-11 | End: 2020-02-14

## 2020-02-11 RX ORDER — POTASSIUM CHLORIDE 20 MEQ
40 PACKET (EA) ORAL ONCE
Refills: 0 | Status: COMPLETED | OUTPATIENT
Start: 2020-02-11 | End: 2020-02-11

## 2020-02-11 RX ADMIN — Medication 81 MILLIGRAM(S): at 11:13

## 2020-02-11 RX ADMIN — Medication 40 MILLIGRAM(S): at 05:30

## 2020-02-11 RX ADMIN — PIPERACILLIN AND TAZOBACTAM 25 GRAM(S): 4; .5 INJECTION, POWDER, LYOPHILIZED, FOR SOLUTION INTRAVENOUS at 05:26

## 2020-02-11 RX ADMIN — Medication 40 MILLIEQUIVALENT(S): at 11:13

## 2020-02-11 RX ADMIN — Medication 40 MILLIGRAM(S): at 18:05

## 2020-02-11 RX ADMIN — PIPERACILLIN AND TAZOBACTAM 25 GRAM(S): 4; .5 INJECTION, POWDER, LYOPHILIZED, FOR SOLUTION INTRAVENOUS at 14:02

## 2020-02-11 RX ADMIN — ENOXAPARIN SODIUM 40 MILLIGRAM(S): 100 INJECTION SUBCUTANEOUS at 11:13

## 2020-02-11 RX ADMIN — MEMANTINE HYDROCHLORIDE 10 MILLIGRAM(S): 10 TABLET ORAL at 11:13

## 2020-02-11 RX ADMIN — Medication 12.5 MILLIGRAM(S): at 18:05

## 2020-02-11 NOTE — DIETITIAN INITIAL EVALUATION ADULT. - ETIOLOGY
Inadequate energy/protein intake & increased energy/protein needs related to advanced dementia, CHF & stage II pressure ulcer

## 2020-02-11 NOTE — PROGRESS NOTE ADULT - ASSESSMENT
Acute hypoxic Respiratory failure.  Acute pulmonary edema.  CHF Systolic/Diastolic.  Bilateral pleural effusion.  A Fib S/P PPM  elevated troponin.  Leukocytosis.  Renal Insuffiencey.  Advanced Dementia.

## 2020-02-11 NOTE — PROGRESS NOTE ADULT - SUBJECTIVE AND OBJECTIVE BOX
Patient is a 89y old  Female who presents with a chief complaint of respiratory distress (10 Feb 2020 18:49)  patient  at baseline. no distress   non verbal, demented.   Patient on IV zosyn , will complete 5 days tomorrow ass per ID recommendation.      INTERVAL HPI/OVERNIGHT EVENTS:  PAST MEDICAL & SURGICAL HISTORY:  Cardiac pacemaker  HTN (hypertension)  Alzheimer's dementia  LOLY (iron deficiency anemia)  Gout  Anxiety  Macular degeneration  Afib: was on coumadin until early 2015, was discontinue d/t fall rsks per son ( lucila )  Head trauma: 2 subdural hematomas 5/2015  Cataract: left eye  Cholecystitis  Rotator cuff arthropathy: left shoulder  Uterine fibroid  Anemia  Infected hand  CA - breast cancer  Gout  High cholesterol  Hypertension  S/P cataract surgery, right: 2006  S/P mastectomy, left: 2004  S/P cataract surgery, left: 2003  S/P knee replacement: 2003  S/P cholecystectomy: 2002  S/P rotator cuff repair: 2002 - left  S/P hysterectomy: partial 1992  History of cholecystectomy  History of knee replacement procedure of right knee  H/O total hysterectomy  History of total mastectomy of right breast  Gallbladder & bile duct stone      MEDICATIONS  (STANDING):  aspirin  chewable 81 milliGRAM(s) Oral daily  atorvastatin 10 milliGRAM(s) Oral at bedtime  diltiazem    milliGRAM(s) Oral daily  donepezil 10 milliGRAM(s) Oral at bedtime  enoxaparin Injectable 40 milliGRAM(s) SubCutaneous daily  furosemide   Injectable 40 milliGRAM(s) IV Push two times a day  memantine 10 milliGRAM(s) Oral daily  piperacillin/tazobactam IVPB.. 3.375 Gram(s) IV Intermittent every 8 hours    MEDICATIONS  (PRN):      Allergies    No Known Allergies    Intolerances        REVIEW OF SYSTEMS:  CONSTITUTIONAL: No fever, weight loss, or fatigue  EYES: No eye pain, visual disturbances, or discharge  ENMT:  No difficulty hearing, tinnitus, vertigo; No sinus or throat pain  NECK: No pain or stiffness  BREASTS: No pain, masses, or nipple discharge  RESPIRATORY: No cough, wheezing, chills or hemoptysis; No shortness of breath  CARDIOVASCULAR: No chest pain, palpitations, dizziness, or leg swelling  GASTROINTESTINAL: No abdominal or epigastric pain. No nausea, vomiting, or hematemesis; No diarrhea or constipation. No melena or hematochezia.  GENITOURINARY: No dysuria, frequency, hematuria, or incontinence  NEUROLOGICAL: No headaches, memory loss, loss of strength, numbness, or tremors  SKIN: No itching, burning, rashes, or lesions   LYMPH NODES: No enlarged glands  ENDOCRINE: No heat or cold intolerance; No hair loss  MUSCULOSKELETAL: No joint pain or swelling; No muscle, back, or extremity pain  PSYCHIATRIC: No depression, anxiety, mood swings, or difficulty sleeping  HEME/LYMPH: No easy bruising, or bleeding gums  ALLERY AND IMMUNOLOGIC: No hives or eczema    Vital Signs Last 24 Hrs  T(C): 36.4 (11 Feb 2020 10:45), Max: 37.5 (10 Feb 2020 23:45)  T(F): 97.6 (11 Feb 2020 10:45), Max: 99.5 (10 Feb 2020 23:45)  HR: 71 (11 Feb 2020 10:45) (68 - 74)  BP: 134/66 (11 Feb 2020 10:45) (111/75 - 137/70)  BP(mean): --  RR: 18 (11 Feb 2020 10:45) (18 - 18)  SpO2: 100% (11 Feb 2020 10:45) (96% - 100%)    PHYSICAL EXAM:  GENERAL: NAD, well-groomed, well-developed  HEAD:  Atraumatic, Normocephalic  EYES: EOMI, PERRLA, conjunctiva and sclera clear  ENMT: No tonsillar erythema, exudates, or enlargement; Moist mucous membranes, Good dentition, No lesions  NECK: Supple, No JVD, Normal thyroid  NERVOUS SYSTEM:  Alert & Oriented X3, Good concentration; Motor Strength 5/5 B/L upper and lower extremities; DTRs 2+ intact and symmetric  CHEST/LUNG: Clear to percussion bilaterally; No rales, rhonchi, wheezing, or rubs  HEART: Regular rate and rhythm; No murmurs, rubs, or gallops  ABDOMEN: Soft, Nontender, Nondistended; Bowel sounds present  EXTREMITIES:  2+ Peripheral Pulses, No clubbing, cyanosis, or edema  LYMPH: No lymphadenopathy noted  SKIN: No rashes or lesions    LABS:                        10.4   10.31 )-----------( 311      ( 10 Feb 2020 06:36 )             32.8     02-10    137  |  95<L>  |  54<H>  ----------------------------<  97  3.4<L>   |  34<H>  |  1.08    Ca    8.4<L>      10 Feb 2020 06:36          Imaging Personally Reviewed:  [ ] YES  [ ] NO    Consultant(s) Notes Reviewed:  [ ] YES  [ ] NO    Care Discussed with Consultants/Other Providers [ ] YES  [ ] NO    Care discussed with family,         [  ]   yes  [  ]  No    imp:    stable[ ]    unstable[  ]     improving [ x  ]       unchanged  [  ]                Plans:  Continue present plans  [ x ] will complete IV zosyn tomorrow.               New consult [  ]   specialty  .......               order test[  ]    test name. labs in am                 Discharge Planning  [ x ]

## 2020-02-11 NOTE — PROGRESS NOTE ADULT - SUBJECTIVE AND OBJECTIVE BOX
Patient is a 89y old  Female who presents with a chief complaint of respiratory distress (11 Feb 2020 14:42)    PAST MEDICAL & SURGICAL HISTORY:  Afib  Cardiac pacemaker  Alzheimer's dementia  LOLY (iron deficiency anemia)  Anxiety  Macular degeneration  Afib: was on coumadin until early 2015, was discontinue d/t fall rsks per son ( lucila )  Head trauma: 2 subdural hematomas 5/2015  Cataract: left eye  Cholecystitis  Rotator cuff arthropathy: left shoulder  Uterine fibroid  Anemia  Infected hand  CA - breast cancer  Gout  High cholesterol  Hypertension  S/P cataract surgery, right: 2006  S/P mastectomy, left: 2004  S/P cataract surgery, left: 2003  S/P knee replacement: 2003  S/P cholecystectomy: 2002  S/P rotator cuff repair: 2002 - left  S/P hysterectomy: partial 1992  History of cholecystectomy  History of knee replacement procedure of right knee  H/O total hysterectomy  History of total mastectomy of right breast  Gallbladder & bile duct stone    INTERVAL HISTORY: resting in bed, not in any acute distress   	  MEDICATIONS:  MEDICATIONS  (STANDING):  aspirin  chewable 81 milliGRAM(s) Oral daily  atorvastatin 10 milliGRAM(s) Oral at bedtime  diltiazem    milliGRAM(s) Oral daily  donepezil 10 milliGRAM(s) Oral at bedtime  enoxaparin Injectable 40 milliGRAM(s) SubCutaneous daily  furosemide   Injectable 40 milliGRAM(s) IV Push two times a day  memantine 10 milliGRAM(s) Oral daily  piperacillin/tazobactam IVPB.. 3.375 Gram(s) IV Intermittent every 8 hours    Vitals:  T(F): 97.6 (02-11-20 @ 10:45), Max: 99.5 (02-10-20 @ 23:45)  HR: 71 (02-11-20 @ 10:45) (68 - 74)  BP: 134/66 (02-11-20 @ 10:45) (111/75 - 137/70)  RR: 18 (02-11-20 @ 10:45) (18 - 18)  SpO2: 100% (02-11-20 @ 10:45) (96% - 100%)    02-10 @ 07:01  -  02-11 @ 07:00  --------------------------------------------------------  IN:    Oral Fluid: 440 mL    Solution: 200 mL  Total IN: 640 mL    OUT:  Total OUT: 0 mL    Total NET: 640 mL      02-11 @ 07:01  -  02-11 @ 16:12  --------------------------------------------------------  IN:    Oral Fluid: 60 mL  Total IN: 60 mL    OUT:  Total OUT: 0 mL    Total NET: 60 mL    PHYSICAL EXAM:  Neuro: Awake, responsive  CV: S1 S2 RRR, + SM  Lungs: bibasilar rales   GI: Soft, BS +, ND, NT  Extremities: +  edema    RADIOLOGY: < from: Xray Chest 1 View- PORTABLE-Urgent (02.07.20 @ 21:43) >    Left dual-lead pacer.    The cardiac silhouette is enlarged. Bibasilar opacities and perihilar opacities. Small bilateral pleural effusions and/or atelectasis.    IMPRESSION: Pulmonary edema versus multifocal pneumonia unchanged from 2/1/2020    < end of copied text >    DIAGNOSTIC TESTING:    [ x] Echocardiogram: < from: TTE Echo Doppler w/o Cont (02.10.20 @ 14:57) >   1. Left ventricular ejection fraction, by visual estimation, is 55 to 60%.   2. There is mild concentric left ventricular hypertrophy.   3. Mild mitral annular calcification.   4. Moderate to severe mitral valve regurgitation.   5. Moderate tricuspid regurgitation.   6. Moderate aortic regurgitation.   7. Estimated pulmonary artery systolic pressure is 54.0 mmHg assuming a right atrial pressure of 5 mmHg, which is consistent with moderate pulmonary hypertension.   8. Peak transaortic gradient equals 78.9 mmHg, mean transaortic gradientequals 43.6 mmHg, the calculated aortic valve area equals 0.61 cm² by the continuity equation consistent with severe aortic stenosis.    < end of copied text >      LABS:	 	    10 Feb 2020 06:36    137    |  95     |  54     ----------------------------<  97     3.4     |  34     |  1.08   09 Feb 2020 08:17    136    |  96     |  54     ----------------------------<  116    3.6     |  31     |  1.08     Ca    8.4        10 Feb 2020 06:36                        10.4   10.31 )-----------( 311      ( 10 Feb 2020 06:36 )             32.8 ,                       10.2   10.17 )-----------( 305      ( 09 Feb 2020 08:17 )             32.2

## 2020-02-11 NOTE — DIETITIAN INITIAL EVALUATION ADULT. - PERTINENT LABORATORY DATA
02-10 Na137 mmol/L Glu 97 mg/dL K+ 3.4 mmol/L<L> Cr  1.08 mg/dL BUN 54 mg/dL<H> WBC 10.31 K/uL 02-07 Alb 2.1 g/dL<L>

## 2020-02-11 NOTE — PROGRESS NOTE ADULT - SUBJECTIVE AND OBJECTIVE BOX
HPI:  Patient is an 88 YO F with a PMH of advance dementia (nonverbal), CHF, HTN, AFib s/p PPM who presents to ED in respiratory distress.  Patient unable to provide history.  Son reported to ED attending clifford she was discharged on oxygen however unsure if the oxygen was working.    Placed on BiPAP in the ED.  Labs show leukocytosis, tropinemia, and significantly elevated BNP.  ED attending discussed case with Dr Lundberg who recommended no anticoagulation for now. Will admit to tele. (08 Feb 2020 02:45)      Allergies    No Known Allergies    Intolerances        MEDICATIONS  (STANDING):  aspirin  chewable 81 milliGRAM(s) Oral daily  atorvastatin 10 milliGRAM(s) Oral at bedtime  diltiazem    milliGRAM(s) Oral daily  donepezil 10 milliGRAM(s) Oral at bedtime  enoxaparin Injectable 40 milliGRAM(s) SubCutaneous daily  furosemide   Injectable 40 milliGRAM(s) IV Push two times a day  memantine 10 milliGRAM(s) Oral daily  piperacillin/tazobactam IVPB.. 3.375 Gram(s) IV Intermittent every 8 hours    MEDICATIONS  (PRN):      REVIEW OF SYSTEMS:    demented poor historian     VITAL SIGNS:  T(C): 36.4 (02-11-20 @ 10:45), Max: 37.5 (02-10-20 @ 23:45)  T(F): 97.6 (02-11-20 @ 10:45), Max: 99.5 (02-10-20 @ 23:45)  HR: 71 (02-11-20 @ 10:45) (68 - 74)  BP: 134/66 (02-11-20 @ 10:45) (111/75 - 137/70)  RR: 18 (02-11-20 @ 10:45) (18 - 18)  SpO2: 100% (02-11-20 @ 10:45) (96% - 100%)  Wt(kg): --    PHYSICAL EXAM:    GENERAL: not in any distress  HEENT: Neck is supple, normocephalic, atraumatic   CHEST/LUNG: Clear to percussion bilaterally; No rales, rhonchi, wheezing  HEART: Regular rate and rhythm; No murmurs, rubs, or gallops  ABDOMEN: Soft, Nontender, Nondistended; Bowel sounds present, no rebound   EXTREMITIES:  2+ Peripheral Pulses, No clubbing, cyanosis, or edema  GENITOURINARY:   SKIN: No rashes or lesions  BACK: no pressor sore   NERVOUS SYSTEM:  Alert & awake     LABS:                         10.4   10.31 )-----------( 311      ( 10 Feb 2020 06:36 )             32.8     02-10    137  |  95<L>  |  54<H>  ----------------------------<  97  3.4<L>   |  34<H>  |  1.08    Ca    8.4<L>      10 Feb 2020 06:36                                Culture Results:   No growth (02-08 @ 11:19)  Culture Results:   No growth to date. (02-08 @ 11:05)  Culture Results:   No growth to date. (02-08 @ 11:05)                Radiology:

## 2020-02-11 NOTE — DIETITIAN INITIAL EVALUATION ADULT. - OTHER INFO
Pt with advanced dementia (non-verbal), unable to obtain diet hx or wt hx from pt, however pt's aid sitting at bedside. Observed pt ate about 60% of her lunch tray. Aid has been assisting pt x 5 years, pt completely dependent for ADLs. Pt lives with son, but aid helps with meal prep/cooking. Pt followed unrestricted diet at home, was tolerating soft cut-up foods & thin liquids at home. Pt eats oatmeal with muffin & coffee for breakfast, sandwich for lunch, & meat with vegetable and starch for dinner. Pt also drinks Ensure daily. Aid did not know pt's UBW but has noticed gradual wt loss.

## 2020-02-11 NOTE — PROGRESS NOTE ADULT - SUBJECTIVE AND OBJECTIVE BOX
INTERVAL HPI:  88 YO F with Advance dementia (nonverbal), CHF, HTN, AFib s/p PPM who presents to ED in Acute  Respiratory distress.  Son reported to ED attending clifford she was discharged on oxygen however unsure if the oxygen was working.   Placed on BiPAP in the ED.  Labs show leukocytosis, Elevated troponin, and significantly elevated BNP.   Pt not able to provide any history,    OVERNIGHT EVENTS:  Resting comfortably.    Vital Signs Last 24 Hrs  T(C): 36.3 (11 Feb 2020 17:10), Max: 37.5 (10 Feb 2020 23:45)  T(F): 97.3 (11 Feb 2020 17:10), Max: 99.5 (10 Feb 2020 23:45)  HR: 69 (11 Feb 2020 17:50) (68 - 73)  BP: 128/67 (11 Feb 2020 17:50) (113/59 - 137/70)  BP(mean): --  RR: 19 (11 Feb 2020 17:50) (18 - 24)  SpO2: 100% (11 Feb 2020 17:50) (96% - 100%)    PHYSICAL EXAM:  GEN:       comfortable.  HEENT:    Normal.    RESP:        no distress  CVS:             Regular rate and rhythm.   ABD:         Soft, non-tender, non-distended;     MEDICATIONS  (STANDING):  aspirin  chewable 81 milliGRAM(s) Oral daily  atorvastatin 10 milliGRAM(s) Oral at bedtime  diltiazem    milliGRAM(s) Oral daily  donepezil 10 milliGRAM(s) Oral at bedtime  enoxaparin Injectable 40 milliGRAM(s) SubCutaneous daily  furosemide   Injectable 40 milliGRAM(s) IV Push two times a day  memantine 10 milliGRAM(s) Oral daily  metoprolol tartrate 12.5 milliGRAM(s) Oral two times a day  piperacillin/tazobactam IVPB.. 3.375 Gram(s) IV Intermittent every 8 hours    LABS:                        10.4   10.31 )-----------( 311      ( 10 Feb 2020 06:36 )             32.8     02-10    137  |  95<L>  |  54<H>  ----------------------------<  97  3.4<L>   |  34<H>  |  1.08    Ca    8.4<L>      10 Feb 2020 06:36    02-08 @ 03:30  pH: 7.57  pCO2: 32  pO2: 82  SaO2: 97  02-07 @ 22:02  pH: 7.55  pCO2: 32  pO2: 85  SaO2: 97    ASSESSMENT AND PLAN:  ·	Acute hypoxic Respiratory failure.  ·	Acute pulmonary edema.  ·	CHF Systolic/Diastolic.  ·	Bilateral pleural effusion.  ·	A Fib S/P PPM  ·	elevated troponin.  ·	Leukocytosis.  ·	Renal Insuffiencey.  ·	Advanced Dementia.    Continue diuretics and empiric antibiotics.  procalcitonin high.

## 2020-02-11 NOTE — DIETITIAN INITIAL EVALUATION ADULT. - PHYSICAL APPEARANCE
BMI: 21.5 (underweight for age; appeared debilitated and emaciated); 2+ Edema L arm & R arm; Nutrition focused physical exam conducted; Subcutaneous fat loss: [severe] Orbital fat pads region, [severe]Buccal fat region, [unable]Triceps region,  [unable]Ribs region.  Muscle wasting: [severe]Temples region, [severe]Clavicle region, [shoulder]Shoulder region, [unable]Scapula region, [unable]Interosseous region,  [unable]thigh region, [unable]Calf region

## 2020-02-11 NOTE — PROGRESS NOTE ADULT - ASSESSMENT
88 y/o F with a PMHx of advanced dementia (nonverbal), CHF, HTN, AFib s/p PPM admitted to tele for respiratory distress requiring BiPAP w/ ?PNA/ B/L pleural effusions. Labs significant for leukocytosis, tropinemia, and elevated BNP   patient was just discharge on po vantin and zithromax on feb 5   -Leukocytosis secondary to possible PNA/UTI, vas stress response  has responded to zosyn   finish 5-7 days   not in distress on exam on NC  aid at bedside   leukocytosis and procalcitonin improving   will switch to po Augmentin am  more alert and awake today

## 2020-02-11 NOTE — PHYSICAL THERAPY INITIAL EVALUATION ADULT - CRITERIA FOR SKILLED THERAPEUTIC INTERVENTIONS
predicted duration of therapy intervention/functional limitations in following categories/Baseline dependent-  not a candidate for skilled PT/risk reduction/prevention/rehab potential/anticipated equipment needs at discharge/anticipated discharge recommendation/impairments found

## 2020-02-11 NOTE — PROGRESS NOTE ADULT - ASSESSMENT
89-year-old female with advanced dementia, chronic atrial fibrillation, hypertension, hyperlipidemia.  Status post pacemaker.  Admitted again with evidence of acute respiratory distress with chest x-ray suggestive of vascular congestion and bilateral lung opacification and elevated BNP. positive troponins noted.  BNP 40,000    Impression:  ·	Acute on chronic HFpEF   ·	NSTEMI  ·	PNA  ·	Chronic afib  ·	Essential HTN  ·	HLD  ·	Advanced dementia    Plan:  -Cont. on IV Lasix, evidence of bibasilar rales and lower extremity edema.   -NSTEMI - conservative management given debility and dementia.  -Cont asa/statin   -Rate well controlled on CCB, will add low add dose bbl  -Monitor electrolytes/renal function/daily weight  -Antibiotics as per medicine/ID  -Not a candidate for aggressive therapeutic measures given advanced dementia and debility. Not a candidate for AC  - Pt is DNR. 89-year-old female with advanced dementia, chronic atrial fibrillation, hypertension, hyperlipidemia.  Status post pacemaker.  Admitted again with evidence of acute respiratory distress with chest x-ray suggestive of vascular congestion and bilateral lung opacification and elevated BNP. positive troponins noted.  BNP 40,000    Impression:  ·	Acute on chronic HFpEF   ·	NSTEMI  ·	PNA  ·	Chronic afib  ·	Essential HTN  ·	HLD  ·	Advanced dementia    Plan:  -Cont. on IV Lasix, evidence of bibasilar rales and lower extremity edema.   -NSTEMI - conservative management given debility and dementia.  -Cont asa/statin   -Rate well controlled on CCB, will add low add dose bbl in setting of NSTEMI  -Monitor electrolytes/renal function/daily weight  -Antibiotics as per medicine/ID  -Not a candidate for aggressive therapeutic measures given advanced dementia and debility. Not a candidate for AC  -Pt is DNR.   -Outpatient follow up with Dr. Lundberg

## 2020-02-11 NOTE — DIETITIAN INITIAL EVALUATION ADULT. - PROBLEM SELECTOR PLAN 3
Started on Zosyn and Vanco in the ED, will continue.  Follow blood cultures - de-escalate antibiotics as needed   ID consult in Am - Otilio

## 2020-02-11 NOTE — DIETITIAN INITIAL EVALUATION ADULT. - NUTRITIONGOAL OUTCOME1
Po intake > 50% for meals/supplements; Maintain wt +/- 2# during hospitalization Po intake > 75% for meals/supplements

## 2020-02-11 NOTE — CHART NOTE - NSCHARTNOTEFT_GEN_A_CORE
Upon Nutritional Assessment by the Registered Dietitian your patient was determined to meet criteria / has evidence of the following diagnosis/diagnoses:          [ ]  Mild Protein Calorie Malnutrition        [ ]  Moderate Protein Calorie Malnutrition        [x] Severe, Chronic Protein Calorie Malnutrition        [ ] Unspecified Protein Calorie Malnutrition        [ ] Underweight / BMI <19        [ ] Morbid Obesity / BMI > 40      Findings as based on:  •  Comprehensive nutrition assessment and consultation  •  Calorie counts (nutrient intake analysis)  •  Food acceptance and intake status from observations by staff  •  Follow up  •  Patient education  •  Intervention secondary to interdisciplinary rounds  •   concerns      Treatment:    The following diet has been recommended:  Recommend Dysphagia 1 Pureed-Thin Liquids, Low Sodium diet + Ensure Enlive 2x daily (700 kcals & 40 gm protein)    PROVIDER Section:     By signing this assessment you are acknowledging and agree with the diagnosis/diagnoses assigned by the Registered Dietitian    Comments:

## 2020-02-11 NOTE — DIETITIAN INITIAL EVALUATION ADULT. - PERTINENT MEDS FT
MEDICATIONS  (STANDING):  aspirin  chewable 81 milliGRAM(s) Oral daily  atorvastatin 10 milliGRAM(s) Oral at bedtime  diltiazem    milliGRAM(s) Oral daily  donepezil 10 milliGRAM(s) Oral at bedtime  enoxaparin Injectable 40 milliGRAM(s) SubCutaneous daily  furosemide   Injectable 40 milliGRAM(s) IV Push two times a day  memantine 10 milliGRAM(s) Oral daily  piperacillin/tazobactam IVPB.. 3.375 Gram(s) IV Intermittent every 8 hours    MEDICATIONS  (PRN):

## 2020-02-11 NOTE — DIETITIAN INITIAL EVALUATION ADULT. - FACTORS AFF FOOD INTAKE
persistent lack of appetite/SOB/change in mental status/difficulty chewing/difficulty feeding self/difficulty swallowing

## 2020-02-11 NOTE — PHYSICAL THERAPY INITIAL EVALUATION ADULT - ADDITIONAL COMMENTS
A sper son Estiven, pt is total dependent, bed bound, non- verbal at baseline, SHe lives in a house with 1 step alanding 2 step a landing, 5 step with bilateral rails, she stays at 1st floor, she has a HHA for mon-fri 24 hrs a day, weekend son take care of her, he also states that they just carry the pt from bed, put in transport WC  during lunch, dinner, however pt tends to lean forward a lot, her sitting posture is worsening.

## 2020-02-11 NOTE — DIETITIAN INITIAL EVALUATION ADULT. - DIET TYPE
Recommend Dysphagia 1 Pureed-Thin Liquids, Low Sodium diet + Ensure Enlive 2x daily (700 kcals & 40 gm protein)

## 2020-02-12 ENCOUNTER — APPOINTMENT (OUTPATIENT)
Dept: CARDIOLOGY | Facility: CLINIC | Age: 85
End: 2020-02-12

## 2020-02-12 LAB
ANION GAP SERPL CALC-SCNC: 8 MMOL/L — SIGNIFICANT CHANGE UP (ref 5–17)
BUN SERPL-MCNC: 48 MG/DL — HIGH (ref 7–23)
CALCIUM SERPL-MCNC: 8.6 MG/DL — SIGNIFICANT CHANGE UP (ref 8.5–10.1)
CHLORIDE SERPL-SCNC: 96 MMOL/L — SIGNIFICANT CHANGE UP (ref 96–108)
CO2 SERPL-SCNC: 34 MMOL/L — HIGH (ref 22–31)
CREAT SERPL-MCNC: 1.24 MG/DL — SIGNIFICANT CHANGE UP (ref 0.5–1.3)
DIGOXIN SERPL-MCNC: 0.89 NG/ML — SIGNIFICANT CHANGE UP (ref 0.8–2)
GLUCOSE SERPL-MCNC: 88 MG/DL — SIGNIFICANT CHANGE UP (ref 70–99)
POTASSIUM SERPL-MCNC: 3.7 MMOL/L — SIGNIFICANT CHANGE UP (ref 3.5–5.3)
POTASSIUM SERPL-SCNC: 3.7 MMOL/L — SIGNIFICANT CHANGE UP (ref 3.5–5.3)
PROCALCITONIN SERPL-MCNC: 0.33 NG/ML — HIGH (ref 0.02–0.1)
SODIUM SERPL-SCNC: 138 MMOL/L — SIGNIFICANT CHANGE UP (ref 135–145)

## 2020-02-12 PROCEDURE — 99232 SBSQ HOSP IP/OBS MODERATE 35: CPT

## 2020-02-12 RX ADMIN — Medication 40 MILLIGRAM(S): at 06:17

## 2020-02-12 RX ADMIN — PIPERACILLIN AND TAZOBACTAM 25 GRAM(S): 4; .5 INJECTION, POWDER, LYOPHILIZED, FOR SOLUTION INTRAVENOUS at 06:18

## 2020-02-12 RX ADMIN — ATORVASTATIN CALCIUM 10 MILLIGRAM(S): 80 TABLET, FILM COATED ORAL at 00:01

## 2020-02-12 RX ADMIN — PIPERACILLIN AND TAZOBACTAM 25 GRAM(S): 4; .5 INJECTION, POWDER, LYOPHILIZED, FOR SOLUTION INTRAVENOUS at 00:31

## 2020-02-12 RX ADMIN — DONEPEZIL HYDROCHLORIDE 10 MILLIGRAM(S): 10 TABLET, FILM COATED ORAL at 22:07

## 2020-02-12 RX ADMIN — Medication 1 TABLET(S): at 18:17

## 2020-02-12 RX ADMIN — MEMANTINE HYDROCHLORIDE 10 MILLIGRAM(S): 10 TABLET ORAL at 14:29

## 2020-02-12 RX ADMIN — Medication 81 MILLIGRAM(S): at 14:28

## 2020-02-12 RX ADMIN — ENOXAPARIN SODIUM 40 MILLIGRAM(S): 100 INJECTION SUBCUTANEOUS at 14:28

## 2020-02-12 RX ADMIN — DONEPEZIL HYDROCHLORIDE 10 MILLIGRAM(S): 10 TABLET, FILM COATED ORAL at 00:01

## 2020-02-12 RX ADMIN — ATORVASTATIN CALCIUM 10 MILLIGRAM(S): 80 TABLET, FILM COATED ORAL at 22:07

## 2020-02-12 RX ADMIN — Medication 40 MILLIGRAM(S): at 18:17

## 2020-02-12 RX ADMIN — Medication 12.5 MILLIGRAM(S): at 18:17

## 2020-02-12 NOTE — PROGRESS NOTE ADULT - ASSESSMENT
89-year-old female with advanced dementia, chronic atrial fibrillation, hypertension, hyperlipidemia.  Status post pacemaker.  Admitted again with evidence of acute respiratory distress with chest x-ray suggestive of vascular congestion and bilateral lung opacification and elevated BNP. positive troponins noted.  BNP 40,000    Impression:  ·	Acute on chronic HFpEF   ·	NSTEMI  ·	PNA  ·	Chronic afib  ·	Essential HTN  ·	HLD  ·	Advanced dementia    Plan:  -Cont. on IV Lasix, evidence of bibasilar rales and lower extremity edema. Switch to PO when clinically warranted or elevation in Cr 1.3 or above  -NSTEMI - conservative management given debility and dementia.  -Cont asa/statin   -Rate well controlled on CCB, will add low add dose bbl in setting of NSTEMI  -Monitor electrolytes/renal function/daily weight  -Antibiotics as per medicine/ID  -Not a candidate for aggressive therapeutic measures given advanced dementia and debility. Not a candidate for AC  -Pt is DNR.   -Outpatient follow up with Dr. Lundberg 1 week after discharge. Will sign off for now, please call if questions.

## 2020-02-12 NOTE — PROGRESS NOTE ADULT - SUBJECTIVE AND OBJECTIVE BOX
INTERVAL HPI:  88 YO F with Advance dementia (nonverbal), CHF, HTN, AFib s/p PPM who presents to ED in Acute  Respiratory distress.  Son reported to ED attending clifford she was discharged on oxygen however unsure if the oxygen was working.   Placed on BiPAP in the ED.  Labs show leukocytosis, Elevated troponin, and significantly elevated BNP.   Pt not able to provide any history,    OVERNIGHT EVENTS:  Comfortable in bed.    Vital Signs Last 24 Hrs  T(C): 36.5 (12 Feb 2020 17:55), Max: 36.6 (12 Feb 2020 05:26)  T(F): 97.7 (12 Feb 2020 17:55), Max: 97.8 (12 Feb 2020 05:26)  HR: 74 (12 Feb 2020 17:55) (70 - 74)  BP: 131/65 (12 Feb 2020 17:55) (114/61 - 131/65)  BP(mean): --  RR: 28 (12 Feb 2020 17:55) (18 - 28)  SpO2: 98% (12 Feb 2020 17:55) (97% - 100%)    PHYSICAL EXAM:  GEN:         comfortable.  HEENT:    Normal.    RESP:        no wheezing.       CVS:           Regular rate and rhythm.   ABD:         Soft, non-tender, non-distended;     MEDICATIONS  (STANDING):  amoxicillin  500 milliGRAM(s)/clavulanate 1 Tablet(s) Oral every 12 hours  aspirin  chewable 81 milliGRAM(s) Oral daily  atorvastatin 10 milliGRAM(s) Oral at bedtime  diltiazem    milliGRAM(s) Oral daily  donepezil 10 milliGRAM(s) Oral at bedtime  enoxaparin Injectable 40 milliGRAM(s) SubCutaneous daily  furosemide   Injectable 40 milliGRAM(s) IV Push two times a day  memantine 10 milliGRAM(s) Oral daily  metoprolol tartrate 12.5 milliGRAM(s) Oral two times a day    LABS:    02-12    138  |  96  |  48<H>  ----------------------------<  88  3.7   |  34<H>  |  1.24    Ca    8.6      12 Feb 2020 08:12    02-08 @ 03:30  pH: 7.57  pCO2: 32  pO2: 82  SaO2: 97  02-07 @ 22:02  pH: 7.55  pCO2: 32  pO2: 85  SaO2: 97    ASSESSMENT AND PLAN:  ·	Acute hypoxic Respiratory failure.  ·	Acute pulmonary edema.  ·	CHF Systolic/Diastolic.  ·	Bilateral pleural effusion.  ·	A Fib S/P PPM  ·	elevated troponin.  ·	Leukocytosis.  ·	Renal Insuffiencey.  ·	Advanced Dementia    SPO2 95% on room air.  Continue antibiotics and diuretics.

## 2020-02-12 NOTE — PROGRESS NOTE ADULT - SUBJECTIVE AND OBJECTIVE BOX
HPI:  Patient is an 88 YO F with a PMH of advance dementia (nonverbal), CHF, HTN, AFib s/p PPM who presents to ED in respiratory distress.  Patient unable to provide history.  Son reported to ED attending clifford she was discharged on oxygen however unsure if the oxygen was working.    Placed on BiPAP in the ED.  Labs show leukocytosis, tropinemia, and significantly elevated BNP.  ED attending discussed case with Dr Lundberg who recommended no anticoagulation for now. Will admit to tele. (08 Feb 2020 02:45)      Allergies    No Known Allergies    Intolerances        MEDICATIONS  (STANDING):  amoxicillin  500 milliGRAM(s)/clavulanate 1 Tablet(s) Oral every 12 hours  aspirin  chewable 81 milliGRAM(s) Oral daily  atorvastatin 10 milliGRAM(s) Oral at bedtime  diltiazem    milliGRAM(s) Oral daily  donepezil 10 milliGRAM(s) Oral at bedtime  enoxaparin Injectable 40 milliGRAM(s) SubCutaneous daily  furosemide   Injectable 40 milliGRAM(s) IV Push two times a day  memantine 10 milliGRAM(s) Oral daily  metoprolol tartrate 12.5 milliGRAM(s) Oral two times a day    MEDICATIONS  (PRN):      REVIEW OF SYSTEMS:  Alert & demented     VITAL SIGNS:  T(C): 36 (02-12-20 @ 10:50), Max: 36.6 (02-12-20 @ 05:26)  T(F): 96.8 (02-12-20 @ 10:50), Max: 97.8 (02-12-20 @ 05:26)  HR: 71 (02-12-20 @ 10:50) (69 - 73)  BP: 123/76 (02-12-20 @ 10:50) (114/61 - 132/63)  RR: 18 (02-12-20 @ 11:51) (18 - 24)  SpO2: 97% (02-12-20 @ 11:51) (97% - 100%)  Wt(kg): --    PHYSICAL EXAM:    GENERAL: not in any distress  HEENT: Neck is supple, normocephalic, atraumatic   CHEST/LUNG: Clear to percussion bilaterally; No rales, rhonchi, wheezing  HEART: Regular rate and rhythm; No murmurs, rubs, or gallops  ABDOMEN: Soft, Nontender, Nondistended; Bowel sounds present, no rebound   EXTREMITIES:  2+ Peripheral Pulses, No clubbing, cyanosis, or edema  GENITOURINARY:   SKIN: No rashes or lesions  BACK: no pressor sore   NERVOUS SYSTEM:  Alert & demented   LABS:     02-12    138  |  96  |  48<H>  ----------------------------<  88  3.7   |  34<H>  |  1.24    Ca    8.6      12 Feb 2020 08:12                                Culture Results:   No growth (02-08 @ 11:19)  Culture Results:   No growth to date. (02-08 @ 11:05)  Culture Results:   No growth to date. (02-08 @ 11:05)                Radiology:

## 2020-02-12 NOTE — PROGRESS NOTE ADULT - SUBJECTIVE AND OBJECTIVE BOX
Patient is a 89y old  Female who presents with a chief complaint of respiratory distress (12 Feb 2020 14:18)    PAST MEDICAL & SURGICAL HISTORY:  Cardiac pacemaker  HTN (hypertension)  Alzheimer's dementia  LOLY (iron deficiency anemia)  Gout  Anxiety  Macular degeneration  Afib: was on coumadin until early 2015, was discontinue d/t fall rsks per son ( lucila )  Head trauma: 2 subdural hematomas 5/2015  Cataract: left eye  Cholecystitis  Rotator cuff arthropathy: left shoulder  Uterine fibroid  Anemia  Infected hand  CA - breast cancer  Gout  High cholesterol  Hypertension  S/P cataract surgery, right: 2006  S/P mastectomy, left: 2004  S/P cataract surgery, left: 2003  S/P knee replacement: 2003  S/P cholecystectomy: 2002  S/P rotator cuff repair: 2002 - left  S/P hysterectomy: partial 1992  History of cholecystectomy  History of knee replacement procedure of right knee  H/O total hysterectomy  History of total mastectomy of right breast  Gallbladder & bile duct stone    INTERVAL HISTORY: Patient in bed comfortable; denies chest pain, Palpitations,  SOB.  	  MEDICATIONS:  MEDICATIONS  (STANDING):  amoxicillin  500 milliGRAM(s)/clavulanate 1 Tablet(s) Oral every 12 hours  aspirin  chewable 81 milliGRAM(s) Oral daily  atorvastatin 10 milliGRAM(s) Oral at bedtime  diltiazem    milliGRAM(s) Oral daily  donepezil 10 milliGRAM(s) Oral at bedtime  enoxaparin Injectable 40 milliGRAM(s) SubCutaneous daily  furosemide   Injectable 40 milliGRAM(s) IV Push two times a day  memantine 10 milliGRAM(s) Oral daily  metoprolol tartrate 12.5 milliGRAM(s) Oral two times a day    MEDICATIONS  (PRN):    Vitals:  T(F): 96.8 (02-12-20 @ 10:50), Max: 97.8 (02-12-20 @ 05:26)  HR: 71 (02-12-20 @ 10:50) (69 - 73)  BP: 123/76 (02-12-20 @ 10:50) (114/61 - 132/63)  RR: 18 (02-12-20 @ 11:51) (18 - 24)  SpO2: 97% (02-12-20 @ 11:51) (97% - 100%)  Wt(kg): --48.5 kg    02-11 @ 07:01 - 02-12 @ 07:00  --------------------------------------------------------  IN:    Oral Fluid: 60 mL  Total IN: 60 mL    OUT:  Total OUT: 0 mL    Total NET: 60 mL    02-12 @ 07:01  - 02-12 @ 15:05  --------------------------------------------------------  IN:    Oral Fluid: 360 mL  Total IN: 360 mL    OUT:  Total OUT: 0 mL    Total NET: 360 mL    PHYSICAL EXAM:  Neuro: Awake, responsive  CV: S1 S2 RRR  Lungs: CTABL  GI: Soft, BS +, ND, NT  Extremities: No edema    RADIOLOGY: < from: Xray Chest 1 View- PORTABLE-Urgent (02.07.20 @ 21:43) >  INTERPRETATION:  XR CHEST URGENT    Single AP view    HISTORY:  Shortness of breath    Comparison: Chest x-ray 2/1/2020      Left dual-lead pacer.    The cardiac silhouette is enlarged. Bibasilar opacities and perihilar opacities. Small bilateral pleural effusions and/or atelectasis.    IMPRESSION: Pulmonary edema versus multifocal pneumonia unchanged from 2/1/2020      < end of copied text >      DIAGNOSTIC TESTING:    [x ] Echocardiogram: < from: TTE Echo Doppler w/o Cont (02.10.20 @ 14:57) >  Summary:   1. Left ventricular ejection fraction, by visual estimation, is 55 to 60%.   2. There is mild concentric left ventricular hypertrophy.   3. Mild mitral annular calcification.   4. Moderate to severe mitral valve regurgitation.   5. Moderate tricuspid regurgitation.   6. Moderate aortic regurgitation.   7. Estimated pulmonary artery systolic pressure is 54.0 mmHg assuming a right atrial pressure of 5 mmHg, which is consistent with moderate pulmonary hypertension.   8. Peak transaortic gradient equals 78.9 mmHg, mean transaortic gradientequals 43.6 mmHg, the calculated aortic valve area equals 0.61 cm² by the continuity equation consistent with severe aortic stenosis.    < end of copied text >    LABS:	 	    12 Feb 2020 08:12    138    |  96     |  48     ----------------------------<  88     3.7     |  34     |  1.24   10 Feb 2020 06:36    137    |  95     |  54     ----------------------------<  97     3.4     |  34     |  1.08     Ca    8.6        12 Feb 2020 08:12                            10.4   10.31 )-----------( 311      ( 10 Feb 2020 06:36 )             32.8

## 2020-02-12 NOTE — PROGRESS NOTE ADULT - SUBJECTIVE AND OBJECTIVE BOX
Patient is a 89y old  Female who presents with a chief complaint of respiratory distress (11 Feb 2020 21:19)   s/p acute respiratory failure.    on IV zosyn    Pul edema- resolving   NSTMI   DNR status   vitals stable   Baseline status    INTERVAL HPI/OVERNIGHT EVENTS:  PAST MEDICAL & SURGICAL HISTORY:  Cardiac pacemaker  HTN (hypertension)  Alzheimer's dementia  LOLY (iron deficiency anemia)  Gout  Anxiety  Macular degeneration  Afib: was on coumadin until early 2015, was discontinue d/t fall rsks per son ( lucila )  Head trauma: 2 subdural hematomas 5/2015  Cataract: left eye  Cholecystitis  Rotator cuff arthropathy: left shoulder  Uterine fibroid  Anemia  Infected hand  CA - breast cancer  Gout  High cholesterol  Hypertension  S/P cataract surgery, right: 2006  S/P mastectomy, left: 2004  S/P cataract surgery, left: 2003  S/P knee replacement: 2003  S/P cholecystectomy: 2002  S/P rotator cuff repair: 2002 - left  S/P hysterectomy: partial 1992  History of cholecystectomy  History of knee replacement procedure of right knee  H/O total hysterectomy  History of total mastectomy of right breast  Gallbladder & bile duct stone      MEDICATIONS  (STANDING):  amoxicillin  500 milliGRAM(s)/clavulanate 1 Tablet(s) Oral every 12 hours  aspirin  chewable 81 milliGRAM(s) Oral daily  atorvastatin 10 milliGRAM(s) Oral at bedtime  diltiazem    milliGRAM(s) Oral daily  donepezil 10 milliGRAM(s) Oral at bedtime  enoxaparin Injectable 40 milliGRAM(s) SubCutaneous daily  furosemide   Injectable 40 milliGRAM(s) IV Push two times a day  memantine 10 milliGRAM(s) Oral daily  metoprolol tartrate 12.5 milliGRAM(s) Oral two times a day    MEDICATIONS  (PRN):      Allergies    No Known Allergies    Intolerances        REVIEW OF SYSTEMS:  CONSTITUTIONAL: No fever, weight loss, or fatigue  EYES: No eye pain, visual disturbances, or discharge  ENMT:  No difficulty hearing, tinnitus, vertigo; No sinus or throat pain  NECK: No pain or stiffness  BREASTS: No pain, masses, or nipple discharge  RESPIRATORY: No cough, wheezing, chills or hemoptysis; No shortness of breath  CARDIOVASCULAR: No chest pain, palpitations, dizziness, or leg swelling  GASTROINTESTINAL: No abdominal or epigastric pain. No nausea, vomiting, or hematemesis; No diarrhea or constipation. No melena or hematochezia.  GENITOURINARY: No dysuria, frequency, hematuria, or incontinence  NEUROLOGICAL: No headaches, memory loss, loss of strength, numbness, or tremors  SKIN: No itching, burning, rashes, or lesions   LYMPH NODES: No enlarged glands  ENDOCRINE: No heat or cold intolerance; No hair loss  MUSCULOSKELETAL: No joint pain or swelling; No muscle, back, or extremity pain  PSYCHIATRIC: No depression, anxiety, mood swings, or difficulty sleeping  HEME/LYMPH: No easy bruising, or bleeding gums  ALLERY AND IMMUNOLOGIC: No hives or eczema    Vital Signs Last 24 Hrs  T(C): 36 (12 Feb 2020 10:50), Max: 36.6 (12 Feb 2020 05:26)  T(F): 96.8 (12 Feb 2020 10:50), Max: 97.8 (12 Feb 2020 05:26)  HR: 71 (12 Feb 2020 10:50) (69 - 73)  BP: 123/76 (12 Feb 2020 10:50) (114/61 - 132/63)  BP(mean): --  RR: 18 (12 Feb 2020 10:50) (18 - 24)  SpO2: 98% (12 Feb 2020 10:50) (97% - 100%)    PHYSICAL EXAM:  GENERAL: NAD, well-groomed, well-developed demented  HEAD:  Atraumatic, Normocephalic  EYES: EOMI, PERRLA, conjunctiva and sclera clear  ENMT: No tonsillar erythema, exudates, or enlargement; Moist mucous membranes, Good dentition, No lesions  NECK: Supple, No JVD, Normal thyroid  NERVOUS SYSTEM:  Alert & Oriented X3, Good concentration; Motor Strength 5/5 B/L upper and lower extremities; DTRs 2+ intact and symmetric  CHEST/LUNG: Clear to percussion bilaterally; No rales, rhonchi, wheezing, or rubs  HEART: Regular rate and rhythm; systolic murmer  all over precordium murmurs, rubs, or gallops  ABDOMEN: Soft, Nontender, Nondistended; Bowel sounds present  EXTREMITIES:  2+ Peripheral Pulses, No clubbing, cyanosis, or edema  LYMPH: No lymphadenopathy noted  SKIN: No rashes or lesions    LABS:    02-12    138  |  96  |  48<H>  ----------------------------<  88  3.7   |  34<H>  |  1.24    Ca    8.6      12 Feb 2020 08:12            CAPILLARY BLOOD GLUCOSE                    RADIOLOGY & ADDITIONAL TESTS:    Imaging Personally Reviewed:  [ ] YES  [ ] NO    Consultant(s) Notes Reviewed:  [ ] YES  [ ] NO    Care Discussed with Consultants/Other Providers [ ] YES  [ ] NO    Care discussed with family,         [  ]   yes  [  ]  No    imp:    stable[ ]    unstable[  ]     improving [  x ]       unchanged  [  ]                Plans:  Continue present plans  [ x ] further plans as per PUL and ID. ,                New consult [  ]   specialty  .......               order test[  ]    test name.                  Discharge Planning  [  ]

## 2020-02-12 NOTE — PROGRESS NOTE ADULT - ASSESSMENT
88 y/o F with a PMHx of advanced dementia (nonverbal), CHF, HTN, AFib s/p PPM admitted to tele for respiratory distress requiring BiPAP w/ ?PNA/ B/L pleural effusions. Labs significant for leukocytosis, tropinemia, and elevated BNP   patient was just discharge on po vantin and zithromax on feb 5   -Leukocytosis secondary to possible PNA/UTI, vas stress response  has responded to zosyn   finish 5-7 days   not in distress on exam on NC  aid at bedside   leukocytosis and procalcitonin improving   will switch to po Augmentin to complete 5 more days

## 2020-02-13 DIAGNOSIS — G30.9 ALZHEIMER'S DISEASE, UNSPECIFIED: ICD-10-CM

## 2020-02-13 DIAGNOSIS — E78.5 HYPERLIPIDEMIA, UNSPECIFIED: ICD-10-CM

## 2020-02-13 DIAGNOSIS — Z85.3 PERSONAL HISTORY OF MALIGNANT NEOPLASM OF BREAST: ICD-10-CM

## 2020-02-13 DIAGNOSIS — Z95.0 PRESENCE OF CARDIAC PACEMAKER: ICD-10-CM

## 2020-02-13 DIAGNOSIS — R09.02 HYPOXEMIA: ICD-10-CM

## 2020-02-13 DIAGNOSIS — E78.00 PURE HYPERCHOLESTEROLEMIA, UNSPECIFIED: ICD-10-CM

## 2020-02-13 DIAGNOSIS — H35.30 UNSPECIFIED MACULAR DEGENERATION: ICD-10-CM

## 2020-02-13 DIAGNOSIS — J18.9 PNEUMONIA, UNSPECIFIED ORGANISM: ICD-10-CM

## 2020-02-13 DIAGNOSIS — Z74.01 BED CONFINEMENT STATUS: ICD-10-CM

## 2020-02-13 DIAGNOSIS — I11.0 HYPERTENSIVE HEART DISEASE WITH HEART FAILURE: ICD-10-CM

## 2020-02-13 DIAGNOSIS — I48.20 CHRONIC ATRIAL FIBRILLATION, UNSPECIFIED: ICD-10-CM

## 2020-02-13 DIAGNOSIS — M10.9 GOUT, UNSPECIFIED: ICD-10-CM

## 2020-02-13 DIAGNOSIS — R06.03 ACUTE RESPIRATORY DISTRESS: ICD-10-CM

## 2020-02-13 DIAGNOSIS — Z66 DO NOT RESUSCITATE: ICD-10-CM

## 2020-02-13 DIAGNOSIS — I50.43 ACUTE ON CHRONIC COMBINED SYSTOLIC (CONGESTIVE) AND DIASTOLIC (CONGESTIVE) HEART FAILURE: ICD-10-CM

## 2020-02-13 DIAGNOSIS — F41.9 ANXIETY DISORDER, UNSPECIFIED: ICD-10-CM

## 2020-02-13 DIAGNOSIS — F02.80 DEMENTIA IN OTHER DISEASES CLASSIFIED ELSEWHERE, UNSPECIFIED SEVERITY, WITHOUT BEHAVIORAL DISTURBANCE, PSYCHOTIC DISTURBANCE, MOOD DISTURBANCE, AND ANXIETY: ICD-10-CM

## 2020-02-13 DIAGNOSIS — D50.9 IRON DEFICIENCY ANEMIA, UNSPECIFIED: ICD-10-CM

## 2020-02-13 DIAGNOSIS — Z96.651 PRESENCE OF RIGHT ARTIFICIAL KNEE JOINT: ICD-10-CM

## 2020-02-13 DIAGNOSIS — I27.20 PULMONARY HYPERTENSION, UNSPECIFIED: ICD-10-CM

## 2020-02-13 DIAGNOSIS — Z90.13 ACQUIRED ABSENCE OF BILATERAL BREASTS AND NIPPLES: ICD-10-CM

## 2020-02-13 LAB
CULTURE RESULTS: SIGNIFICANT CHANGE UP
CULTURE RESULTS: SIGNIFICANT CHANGE UP
SPECIMEN SOURCE: SIGNIFICANT CHANGE UP
SPECIMEN SOURCE: SIGNIFICANT CHANGE UP

## 2020-02-13 RX ORDER — DIGOXIN 250 MCG
0.25 TABLET ORAL DAILY
Refills: 0 | Status: DISCONTINUED | OUTPATIENT
Start: 2020-02-13 | End: 2020-02-14

## 2020-02-13 RX ADMIN — DONEPEZIL HYDROCHLORIDE 10 MILLIGRAM(S): 10 TABLET, FILM COATED ORAL at 21:34

## 2020-02-13 RX ADMIN — ENOXAPARIN SODIUM 40 MILLIGRAM(S): 100 INJECTION SUBCUTANEOUS at 11:29

## 2020-02-13 RX ADMIN — Medication 12.5 MILLIGRAM(S): at 05:09

## 2020-02-13 RX ADMIN — Medication 40 MILLIGRAM(S): at 17:07

## 2020-02-13 RX ADMIN — Medication 40 MILLIGRAM(S): at 05:08

## 2020-02-13 RX ADMIN — Medication 0.25 MILLIGRAM(S): at 17:07

## 2020-02-13 RX ADMIN — Medication 12.5 MILLIGRAM(S): at 17:07

## 2020-02-13 RX ADMIN — MEMANTINE HYDROCHLORIDE 10 MILLIGRAM(S): 10 TABLET ORAL at 11:29

## 2020-02-13 RX ADMIN — Medication 240 MILLIGRAM(S): at 05:08

## 2020-02-13 RX ADMIN — Medication 1 TABLET(S): at 05:08

## 2020-02-13 RX ADMIN — ATORVASTATIN CALCIUM 10 MILLIGRAM(S): 80 TABLET, FILM COATED ORAL at 21:34

## 2020-02-13 RX ADMIN — Medication 1 TABLET(S): at 17:07

## 2020-02-13 RX ADMIN — Medication 81 MILLIGRAM(S): at 11:29

## 2020-02-13 NOTE — PROGRESS NOTE ADULT - SUBJECTIVE AND OBJECTIVE BOX
Patient is a 89y old  Female who presents with a chief complaint of respiratory distress (12 Feb 2020 21:25)  vitals stable.    no distress.   patient is at baseline without supplemental oxygen.  Eating well when fed.   still non verbal.    DNR status, bedridden. In palliative care.   On PO antibiotics.   Cardiology notes apprecaited.    INTERVAL HPI/OVERNIGHT EVENTS:  PAST MEDICAL & SURGICAL HISTORY:  Cardiac pacemaker  HTN (hypertension)  Alzheimer's dementia  LOLY (iron deficiency anemia)  Gout  Anxiety  Macular degeneration  Afib: was on coumadin until early 2015, was discontinue d/t fall rsks per son ( lucila )  Head trauma: 2 subdural hematomas 5/2015  Cataract: left eye  Cholecystitis  Rotator cuff arthropathy: left shoulder  Uterine fibroid  Anemia  Infected hand  CA - breast cancer  Gout  High cholesterol  Hypertension  S/P cataract surgery, right: 2006  S/P mastectomy, left: 2004  S/P cataract surgery, left: 2003  S/P knee replacement: 2003  S/P cholecystectomy: 2002  S/P rotator cuff repair: 2002 - left  S/P hysterectomy: partial 1992  History of cholecystectomy  History of knee replacement procedure of right knee  H/O total hysterectomy  History of total mastectomy of right breast  Gallbladder & bile duct stone      MEDICATIONS  (STANDING):  amoxicillin  500 milliGRAM(s)/clavulanate 1 Tablet(s) Oral every 12 hours  aspirin  chewable 81 milliGRAM(s) Oral daily  atorvastatin 10 milliGRAM(s) Oral at bedtime  digoxin     Tablet 0.25 milliGRAM(s) Oral daily  diltiazem    milliGRAM(s) Oral daily  donepezil 10 milliGRAM(s) Oral at bedtime  enoxaparin Injectable 40 milliGRAM(s) SubCutaneous daily  furosemide   Injectable 40 milliGRAM(s) IV Push two times a day  memantine 10 milliGRAM(s) Oral daily  metoprolol tartrate 12.5 milliGRAM(s) Oral two times a day    MEDICATIONS  (PRN):      Allergies    No Known Allergies    Intolerances        REVIEW OF SYSTEMS:  CONSTITUTIONAL: No fever, weight loss, or fatigue  EYES: No eye pain, visual disturbances, or discharge  ENMT:  No difficulty hearing, tinnitus, vertigo; No sinus or throat pain  NECK: No pain or stiffness  BREASTS: No pain, masses, or nipple discharge  RESPIRATORY: No cough, wheezing, chills or hemoptysis; No shortness of breath  CARDIOVASCULAR: No chest pain, palpitations, dizziness, or leg swelling  GASTROINTESTINAL: No abdominal or epigastric pain. No nausea, vomiting, or hematemesis; No diarrhea or constipation. No melena or hematochezia.  GENITOURINARY: No dysuria, frequency, hematuria, or incontinence  NEUROLOGICAL: No headaches, memory loss, loss of strength, numbness, or tremors  SKIN: No itching, burning, rashes, or lesions   LYMPH NODES: No enlarged glands  ENDOCRINE: No heat or cold intolerance; No hair loss  MUSCULOSKELETAL: No joint pain or swelling; No muscle, back, or extremity pain  PSYCHIATRIC: No depression, anxiety, mood swings, or difficulty sleeping  HEME/LYMPH: No easy bruising, or bleeding gums  ALLERY AND IMMUNOLOGIC: No hives or eczema    Vital Signs Last 24 Hrs  T(C): 36.6 (13 Feb 2020 04:57), Max: 37.1 (12 Feb 2020 23:52)  T(F): 97.8 (13 Feb 2020 04:57), Max: 98.8 (12 Feb 2020 23:52)  HR: 74 (13 Feb 2020 06:46) (70 - 74)  BP: 130/62 (13 Feb 2020 06:46) (120/63 - 131/65)  BP(mean): --  RR: 16 (13 Feb 2020 06:46) (16 - 28)  SpO2: 97% (13 Feb 2020 06:46) (97% - 98%)    PHYSICAL EXAM:  GENERAL: NAD, well-groomed, well-developed  HEAD:  Atraumatic, Normocephalic  EYES: EOMI, PERRLA, conjunctiva and sclera clear  ENMT: No tonsillar erythema, exudates, or enlargement; Moist mucous membranes, Good dentition, No lesions  NECK: Supple, No JVD, Normal thyroid  NERVOUS SYSTEM:  Alert. Non verbal. at baseline function.  moves all extremities  CHEST/LUNG: Clear to percussion bilaterally; No rales, rhonchi, wheezing, or rubs  HEART: Regular rate and rhythm; No murmurs, rubs, or gallops  ABDOMEN: Soft, Nontender, Nondistended; Bowel sounds present  EXTREMITIES:  2+ Peripheral Pulses, No clubbing, cyanosis, or edema  LYMPH: No lymphadenopathy noted  SKIN: No rashes or lesions    LABS:    02-12    138  |  96  |  48<H>  ----------------------------<  88  3.7   |  34<H>  |  1.24    Ca    8.6      12 Feb 2020 08:12            CAPILLARY BLOOD GLUCOSE                    RADIOLOGY & ADDITIONAL TESTS:    Imaging Personally Reviewed:  [ ] YES  [ ] NO    Consultant(s) Notes Reviewed:  [ ] YES  [ ] NO    Care Discussed with Consultants/Other Providers [ ] YES  [ ] NO    Care discussed with family,         [  ]   yes  [  ]  No    imp:    stable[ ]    unstable[  ]     improving [   ]       unchanged  [  ]                Plans:  Continue present plans  [ x ] stable  for discharge  after clearance from pulmonary.               New consult [  ]   specialty  .......               order test[  ]    test name.                  Discharge Planning  [ x ]

## 2020-02-13 NOTE — PROGRESS NOTE ADULT - ASSESSMENT
88 y/o F with a PMHx of advanced dementia (nonverbal), CHF, HTN, AFib s/p PPM admitted to tele for respiratory distress requiring BiPAP w/ ?PNA/ B/L pleural effusions. Labs significant for leukocytosis, tropinemia, and elevated BNP   patient was just discharge on po vantin and zithromax on feb 5   -Leukocytosis secondary to possible PNA/UTI, vas stress response  has responded to zosyn   and then augmentin   not in distress on exam on NC  aid at bedside   leukocytosis and procalcitonin improving   will switch to po Augmentin to complete till 2/15/2020

## 2020-02-13 NOTE — PROGRESS NOTE ADULT - SUBJECTIVE AND OBJECTIVE BOX
10 YO F with Advance dementia (nonverbal), CHF, HTN, AFib s/p PPM who presents to ED in Acute  Respiratory distress.  Son reported to ED attending clifford she was discharged on oxygen however unsure if the oxygen was working.   Placed on BiPAP in the ED.  Labs show leukocytosis, Elevated troponin, and significantly elevated BNP.   Pt not able to provide any history,  all events noted         Allergies    No Known Allergies    Intolerances        MEDICATIONS  (STANDING):  amoxicillin  500 milliGRAM(s)/clavulanate 1 Tablet(s) Oral every 12 hours  aspirin  chewable 81 milliGRAM(s) Oral daily  atorvastatin 10 milliGRAM(s) Oral at bedtime  digoxin     Tablet 0.25 milliGRAM(s) Oral daily  diltiazem    milliGRAM(s) Oral daily  donepezil 10 milliGRAM(s) Oral at bedtime  enoxaparin Injectable 40 milliGRAM(s) SubCutaneous daily  furosemide   Injectable 40 milliGRAM(s) IV Push two times a day  memantine 10 milliGRAM(s) Oral daily  metoprolol tartrate 12.5 milliGRAM(s) Oral two times a day    MEDICATIONS  (PRN):      REVIEW OF SYSTEMS:    unable to obtain   VITAL SIGNS:  T(C): 36.4 (02-13-20 @ 18:16), Max: 37.1 (02-12-20 @ 23:52)  T(F): 97.5 (02-13-20 @ 18:16), Max: 98.8 (02-12-20 @ 23:52)  HR: 71 (02-13-20 @ 18:16) (70 - 74)  BP: 123/65 (02-13-20 @ 18:16) (120/63 - 130/62)  RR: 17 (02-13-20 @ 18:16) (16 - 18)  SpO2: 98% (02-13-20 @ 18:16) (97% - 98%)  Wt(kg): --    PHYSICAL EXAM:    GENERAL: not in any distress  HEENT: Neck is supple, normocephalic, atraumatic   CHEST/LUNG: Clear to auscultation bilaterally; No rales, rhonchi, wheezing  HEART: Regular rate and rhythm; No murmurs, rubs, or gallops  ABDOMEN: Soft, Nontender, Nondistended; Bowel sounds present, no rebound   EXTREMITIES:  2+ Peripheral Pulses, No clubbing, cyanosis, or edema  GENITOURINARY: NEGATIVE   SKIN: No rashes or lesions  BACK: no pressor sore   NERVOUS SYSTEM:  Alert & non verbal    LABS:     02-12    138  |  96  |  48<H>  ----------------------------<  88  3.7   |  34<H>  |  1.24    Ca    8.6      12 Feb 2020 08:12                                Culture Results:   No growth (02-08 @ 11:19)  Culture Results:   No growth at 5 days. (02-08 @ 11:05)  Culture Results:   No growth at 5 days. (02-08 @ 11:05)                Radiology:

## 2020-02-14 ENCOUNTER — TRANSCRIPTION ENCOUNTER (OUTPATIENT)
Age: 85
End: 2020-02-14

## 2020-02-14 VITALS
OXYGEN SATURATION: 97 % | DIASTOLIC BLOOD PRESSURE: 64 MMHG | TEMPERATURE: 97 F | SYSTOLIC BLOOD PRESSURE: 116 MMHG | RESPIRATION RATE: 17 BRPM | WEIGHT: 105.6 LBS | HEART RATE: 70 BPM

## 2020-02-14 PROCEDURE — 99238 HOSP IP/OBS DSCHRG MGMT 30/<: CPT

## 2020-02-14 RX ORDER — FUROSEMIDE 40 MG
1 TABLET ORAL
Qty: 30 | Refills: 0
Start: 2020-02-14 | End: 2020-03-14

## 2020-02-14 RX ORDER — DONEPEZIL HYDROCHLORIDE 10 MG/1
1 TABLET, FILM COATED ORAL
Qty: 0 | Refills: 0 | DISCHARGE
Start: 2020-02-14

## 2020-02-14 RX ADMIN — Medication 0.25 MILLIGRAM(S): at 05:24

## 2020-02-14 RX ADMIN — Medication 12.5 MILLIGRAM(S): at 05:24

## 2020-02-14 RX ADMIN — ENOXAPARIN SODIUM 40 MILLIGRAM(S): 100 INJECTION SUBCUTANEOUS at 11:01

## 2020-02-14 RX ADMIN — Medication 1 TABLET(S): at 05:23

## 2020-02-14 RX ADMIN — MEMANTINE HYDROCHLORIDE 10 MILLIGRAM(S): 10 TABLET ORAL at 11:01

## 2020-02-14 RX ADMIN — Medication 81 MILLIGRAM(S): at 11:01

## 2020-02-14 RX ADMIN — Medication 40 MILLIGRAM(S): at 05:23

## 2020-02-14 NOTE — PROGRESS NOTE ADULT - SUBJECTIVE AND OBJECTIVE BOX
HPI:  Patient is an 89 YO F with a PMH of advance dementia (nonverbal), CHF, HTN, AFib s/p PPM who presents to ED in respiratory distress.  Patient unable to provide history.  Son reported to ED attending clifford she was discharged on oxygen however unsure if the oxygen was working.    Placed on BiPAP in the ED.  Labs show leukocytosis, tropinemia, and significantly elevated BNP.  ED attending discussed case with Dr Lundberg who recommended no anticoagulation for now. Will admit to tele. (08 Feb 2020 02:45)      PAST MEDICAL & SURGICAL HISTORY:  Cardiac pacemaker  HTN (hypertension)  Alzheimer's dementia  LOLY (iron deficiency anemia)  Gout  Anxiety  Macular degeneration  Afib: was on coumadin until early 2015, was discontinue d/t fall rsks per son ( lucila )  Head trauma: 2 subdural hematomas 5/2015  Cataract: left eye  Cholecystitis  Rotator cuff arthropathy: left shoulder  Uterine fibroid  Anemia  Infected hand  CA - breast cancer  Gout  High cholesterol  Hypertension  S/P cataract surgery, right: 2006  S/P mastectomy, left: 2004  S/P cataract surgery, left: 2003  S/P knee replacement: 2003  S/P cholecystectomy: 2002  S/P rotator cuff repair: 2002 - left  S/P hysterectomy: partial 1992  History of cholecystectomy  History of knee replacement procedure of right knee  H/O total hysterectomy  History of total mastectomy of right breast  Gallbladder & bile duct stone      SOCHX:   tobacco,  -  alcohol    FMHX: FA/MO  - contributory       Recent Travel:    Immunizations:    Allergies    No Known Allergies    Intolerances        MEDICATIONS  (STANDING):  amoxicillin  500 milliGRAM(s)/clavulanate 1 Tablet(s) Oral every 12 hours  aspirin  chewable 81 milliGRAM(s) Oral daily  atorvastatin 10 milliGRAM(s) Oral at bedtime  digoxin     Tablet 0.25 milliGRAM(s) Oral daily  diltiazem    milliGRAM(s) Oral daily  donepezil 10 milliGRAM(s) Oral at bedtime  enoxaparin Injectable 40 milliGRAM(s) SubCutaneous daily  furosemide   Injectable 40 milliGRAM(s) IV Push two times a day  memantine 10 milliGRAM(s) Oral daily  metoprolol tartrate 12.5 milliGRAM(s) Oral two times a day    MEDICATIONS  (PRN):      REVIEW OF SYSTEMS:  demented        VITAL SIGNS:    T(C): 36.2 (02-14-20 @ 06:01), Max: 36.4 (02-13-20 @ 18:16)  T(F): 97.1 (02-14-20 @ 06:01), Max: 97.5 (02-13-20 @ 18:16)  HR: 70 (02-14-20 @ 06:01) (70 - 71)  BP: 116/64 (02-14-20 @ 06:01) (116/64 - 125/60)  RR: 17 (02-14-20 @ 06:01) (17 - 17)  SpO2: 97% (02-14-20 @ 06:01) (97% - 98%)    PHYSICAL EXAM:    GENERAL: NAD, well-groomed, well-developed  HEAD:  Atraumatic, Normocephalic  EYES: EOMI, PERRLA, conjunctiva and sclera clear  ENMT: No tonsillar erythema, exudates, or enlargement; Moist mucous membranes, Good dentition, No lesions  NECK: Supple, No JVD, Normal thyroid  NERVOUS SYSTEM:  Alert & nonverbal most of time   CHEST/LUNG: Clear bilaterally; No rales, rhonchi, wheezing bilaterally  HEART: Regular rate and rhythm; No murmurs, rubs, or gallops  ABDOMEN: Soft, Nontender, Nondistended; Bowel sounds present  EXTREMITIES:  2+ Peripheral Pulses, No clubbing, cyanosis, or edema  LYMPH: No lymphadenopathy noted  SKIN: No rashes or lesions  BACK: no pressor sore     LABS:                                     Culture Results:   No growth (02-08 @ 11:19)  Culture Results:   No growth at 5 days. (02-08 @ 11:05)  Culture Results:   No growth at 5 days. (02-08 @ 11:05)                Radiology:

## 2020-02-14 NOTE — PROGRESS NOTE ADULT - REASON FOR ADMISSION
respiratory distress

## 2020-02-14 NOTE — DISCHARGE NOTE PROVIDER - NSDCCPCAREPLAN_GEN_ALL_CORE_FT
PRINCIPAL DISCHARGE DIAGNOSIS  Diagnosis: CHF (congestive heart failure)  Assessment and Plan of Treatment: HFpEF. , aortic stenosis conseravative management , hospice care      SECONDARY DISCHARGE DIAGNOSES  Diagnosis: NSTEMI (non-ST elevated myocardial infarction)  Assessment and Plan of Treatment: on b Blocker and  aspirin

## 2020-02-14 NOTE — DISCHARGE NOTE NURSING/CASE MANAGEMENT/SOCIAL WORK - PATIENT PORTAL LINK FT
You can access the FollowMyHealth Patient Portal offered by [facility] by registering at the following website: [email or web address]. By joining [facility] FollowMyHealth portal, you will also be able to view your health information using other applications (apps) compatible with our system.

## 2020-02-14 NOTE — DISCHARGE NOTE PROVIDER - HOSPITAL COURSE
Patient is an 88 YO F with a PMH of advance dementia (nonverbal), CHF, HTN, AFib s/p PPM who presents to ED in respiratory distress.  Patient unable to provide history.  Son reported to ED attending that she was discharged on oxygen however unsure if the oxygen was working.    Placed on BiPAP in the ED.  Labs show leukocytosis, tropinemia, and significantly elevated BNP.  ED attending discussed case with Dr Lundberg who recommended no anticoagulation for now. patient had acute PUl edema  on chest xray and   was in acute hypoxic resp failure. She improved on  BIPAP and diuresis. . She is in palliative care and  family was in agreement with comfort care. . She was placed empirically on IV Zosyn and this was/ discontinued after 5 days. Her  Procalcitonin levels were  elevated.  Troponin was elevated. Her EKG showed paced rhythm. She improved with conservative management and returned to baseline functionality .       She was assesses for Home Hospice and will be discharged to Home with hospice care. She has DNR status. patient was assesses by her cardiologist , dr Lundberg  as well as by PULmonary, Dr saleem and ID , Dr Yusuf.               Acute hypoxic Respiratory failure.    Acute pulmonary edema.    acute on chronic HFpEF.    Bilateral pleural effusion.    A Fib S/P PPM     Moderate Aortic stenosis    Pacemaker ststus    elevated troponin.    Leukocytosis.    Renal Insuffiencey.    Advanced Dementia.     she remained stable on room air. Patient is an 88 YO F with a PMH of advance dementia (nonverbal), CHF, HTN, AFib s/p PPM who presents to ED in respiratory distress.  Patient unable to provide history.  Son reported to ED attending that she was discharged on oxygen however unsure if the oxygen was working.    Placed on BiPAP in the ED.  Labs show leukocytosis, tropinemia, and significantly elevated BNP.  ED attending discussed case with Dr Lundberg who recommended no anticoagulation for now. patient had acute PUl edema  on chest xray and   was in acute hypoxic resp failure. She improved on  BIPAP and diuresis. . She is in palliative care and  family was in agreement with comfort care. . She was placed empirically on IV Zosyn and this was/ discontinued after 5 days. Her  Procalcitonin levels were  elevated.  Troponin was elevated. Her EKG showed paced rhythm. She improved with conservative management and returned to baseline functionality .       She was assesses for Home Hospice and will be discharged to Home with hospice care. She has DNR status. patient was assesses by her cardiologist , dr Lundberg  as well as by PULmonary, Dr saleem and ID , Dr Yusuf.               Acute hypoxic Respiratory failure.    Acute pulmonary edema.    acute on chronic HFpEF.    Bilateral pleural effusion.    A Fib S/P PPM    Severe Aortic stenosis     Mitral regurgitation     aortic regurgitation    Pacemaker ststus    elevated troponin.    Leukocytosis.    Renal Insuffiencey.    Advanced Dementia.     she remained stable on room air.

## 2020-02-14 NOTE — DISCHARGE NOTE PROVIDER - NSDCMRMEDTOKEN_GEN_ALL_CORE_FT
allopurinol 100 mg oral tablet: 1 tab(s) orally once a day  ALPRAZolam 0.25 mg oral tablet:  orally once a day in am  atorvastatin 10 mg oral tablet: 1 tab(s) orally once a day (at bedtime)  azithromycin 500 mg oral tablet: 1 tab(s) orally once a day  calcium carbonate 600 mg oral tablet, chewable: 1 tab(s) orally once a day  Cartia  mg/24 hours oral capsule, extended release: 1 cap(s) orally once a day in am    cefpodoxime 100 mg oral tablet: 1 tab(s) orally every 12 hours  digoxin 250 mcg (0.25 mg) oral tablet: 1 tab(s) orally once a day in am  multivitamin: 1 tab(s) orally once a day  Namzaric 28 mg-10 mg oral capsule, extended release: 1 cap(s) orally once a day  Vitamin D3 2000 intl units oral capsule: 1 cap(s) orally once a day  Zetia 10 mg oral tablet: 1 tab(s) orally once a day allopurinol 100 mg oral tablet: 1 tab(s) orally once a day  ALPRAZolam 0.25 mg oral tablet:  orally once a day in am  atorvastatin 10 mg oral tablet: 1 tab(s) orally once a day (at bedtime)  calcium carbonate 600 mg oral tablet, chewable: 1 tab(s) orally once a day  Cartia  mg/24 hours oral capsule, extended release: 1 cap(s) orally once a day in am    donepezil 10 mg oral tablet: 1 tab(s) orally once a day (at bedtime)  multivitamin: 1 tab(s) orally once a day  Namzaric 28 mg-10 mg oral capsule, extended release: 1 cap(s) orally once a day  Vitamin D3 2000 intl units oral capsule: 1 cap(s) orally once a day  Zetia 10 mg oral tablet: 1 tab(s) orally once a day allopurinol 100 mg oral tablet: 1 tab(s) orally once a day  ALPRAZolam 0.25 mg oral tablet:  orally once a day in am  atorvastatin 10 mg oral tablet: 1 tab(s) orally once a day (at bedtime)  calcium carbonate 600 mg oral tablet, chewable: 1 tab(s) orally once a day  Cartia  mg/24 hours oral capsule, extended release: 1 cap(s) orally once a day in am    donepezil 10 mg oral tablet: 1 tab(s) orally once a day (at bedtime)  Lasix 20 mg oral tablet: 1 tab(s) orally once a day  multivitamin: 1 tab(s) orally once a day  Namzaric 28 mg-10 mg oral capsule, extended release: 1 cap(s) orally once a day  Vitamin D3 2000 intl units oral capsule: 1 cap(s) orally once a day  Zetia 10 mg oral tablet: 1 tab(s) orally once a day

## 2020-02-14 NOTE — PROGRESS NOTE ADULT - SUBJECTIVE AND OBJECTIVE BOX
Patient is a 89y old  Female who presents with a chief complaint of respiratory distress (13 Feb 2020 23:32)  patient is back at baseline.    severe dementia with a. fib and PPm.  recent cardiac event with PUl edema HFpEF   Hospice consult requested. For home hospice.     INTERVAL HPI/OVERNIGHT EVENTS:  PAST MEDICAL & SURGICAL HISTORY:  Cardiac pacemaker  HTN (hypertension)  Alzheimer's dementia  LOLY (iron deficiency anemia)  Gout  Anxiety  Macular degeneration  Afib: was on coumadin until early 2015, was discontinue d/t fall rsks per son ( lucila )  Head trauma: 2 subdural hematomas 5/2015  Cataract: left eye  Cholecystitis  Rotator cuff arthropathy: left shoulder  Uterine fibroid  Anemia  Infected hand  CA - breast cancer  Gout  High cholesterol  Hypertension  S/P cataract surgery, right: 2006  S/P mastectomy, left: 2004  S/P cataract surgery, left: 2003  S/P knee replacement: 2003  S/P cholecystectomy: 2002  S/P rotator cuff repair: 2002 - left  S/P hysterectomy: partial 1992  History of cholecystectomy  History of knee replacement procedure of right knee  H/O total hysterectomy  History of total mastectomy of right breast  Gallbladder & bile duct stone      MEDICATIONS  (STANDING):  amoxicillin  500 milliGRAM(s)/clavulanate 1 Tablet(s) Oral every 12 hours  aspirin  chewable 81 milliGRAM(s) Oral daily  atorvastatin 10 milliGRAM(s) Oral at bedtime  digoxin     Tablet 0.25 milliGRAM(s) Oral daily  diltiazem    milliGRAM(s) Oral daily  donepezil 10 milliGRAM(s) Oral at bedtime  enoxaparin Injectable 40 milliGRAM(s) SubCutaneous daily  furosemide   Injectable 40 milliGRAM(s) IV Push two times a day  memantine 10 milliGRAM(s) Oral daily  metoprolol tartrate 12.5 milliGRAM(s) Oral two times a day    MEDICATIONS  (PRN):      Allergies    No Known Allergies    Intolerances        REVIEW OF SYSTEMS:  CONSTITUTIONAL: No fever, weight loss, or fatigue  EYES: No eye pain, visual disturbances, or discharge  ENMT:  No difficulty hearing, tinnitus, vertigo; No sinus or throat pain  NECK: No pain or stiffness  BREASTS: No pain, masses, or nipple discharge  RESPIRATORY: No cough, wheezing, chills or hemoptysis; No shortness of breath  CARDIOVASCULAR: No chest pain, palpitations, dizziness, or leg swelling  GASTROINTESTINAL: No abdominal or epigastric pain. No nausea, vomiting, or hematemesis; No diarrhea or constipation. No melena or hematochezia.  GENITOURINARY: No dysuria, frequency, hematuria, or incontinence  NEUROLOGICAL: No headaches, memory loss, loss of strength, numbness, or tremors  SKIN: No itching, burning, rashes, or lesions   LYMPH NODES: No enlarged glands  ENDOCRINE: No heat or cold intolerance; No hair loss  MUSCULOSKELETAL: No joint pain or swelling; No muscle, back, or extremity pain  PSYCHIATRIC: No depression, anxiety, mood swings, or difficulty sleeping  HEME/LYMPH: No easy bruising, or bleeding gums  ALLERY AND IMMUNOLOGIC: No hives or eczema    Vital Signs Last 24 Hrs  T(C): 36.2 (14 Feb 2020 06:01), Max: 36.4 (13 Feb 2020 18:16)  T(F): 97.1 (14 Feb 2020 06:01), Max: 97.5 (13 Feb 2020 18:16)  HR: 70 (14 Feb 2020 06:01) (70 - 71)  BP: 116/64 (14 Feb 2020 06:01) (116/64 - 125/60)  BP(mean): --  RR: 17 (14 Feb 2020 06:01) (17 - 17)  SpO2: 97% (14 Feb 2020 06:01) (97% - 98%)    PHYSICAL EXAM:  GENERAL: NAD, well-groomed, well-developed. demented  HEAD:  Atraumatic, Normocephalic  EYES: EOMI, PERRLA, conjunctiva and sclera clear  ENMT: No tonsillar erythema, exudates, or enlargement; Moist mucous membranes, Good dentition, No lesions  NECK: Supple, No JVD, Normal thyroid  NERVOUS SYSTEM:  Alert & Oriented X3, Good concentration; Motor Strength 5/5 B/L upper and lower extremities; DTRs 2+ intact and symmetric  CHEST/LUNG: Clear to percussion bilaterally; No rales, rhonchi, wheezing, or rubs  HEART: Regular rate and rhythm; No murmurs, rubs, or gallops  ABDOMEN: Soft, Nontender, Nondistended; Bowel sounds present  EXTREMITIES:  2+ Peripheral Pulses, No clubbing, cyanosis, or edema  LYMPH: No lymphadenopathy noted  SKIN: No rashes or lesions      Imaging Personally Reviewed:  [ ] YES  [ ] NO    Consultant(s) Notes Reviewed:  [ ] YES  [ ] NO    Care Discussed with Consultants/Other Providers [ x] YES  [ ] NO    Care discussed with family,         [ x ]   yes  [  ]  No    imp:    stable[ ]    unstable[  ]     improving [  x ]       unchanged  [  ]                Plans:  Continue present plans  [ x ]               New consult [  ]   specialty  .......               order test[  ]    test name.                  Discharge Planning  [ x ]     for home Hospice.

## 2020-02-14 NOTE — PROGRESS NOTE ADULT - ASSESSMENT
89 y/o F with a PMHx of advanced dementia (nonverbal), CHF, HTN, AFib s/p PPM admitted to tele for respiratory distress requiring BiPAP w/ ?PNA/ B/L pleural effusions. Labs significant for leukocytosis, tropinemia, and elevated BNP   patient was just discharge on po vantin and zithromax on feb 5   -Leukocytosis secondary to possible PNA/UTI, vas stress response  has responded to zosyn   and then augmentin   not in distress on exam on NC   stable eating with aid   leukocytosis and procalcitonin improving   on po Augmentin to complete till 2/15/2020  id will sign off   please call back for any concern

## 2020-02-14 NOTE — PROGRESS NOTE ADULT - SUBJECTIVE AND OBJECTIVE BOX
INTERVAL HPI:  89 YO F with Advance dementia (nonverbal), CHF, HTN, AFib s/p PPM who presents to ED in Acute  Respiratory distress.  Son reported to ED attending clifford she was discharged on oxygen however unsure if the oxygen was working.   Placed on BiPAP in the ED.  Labs show leukocytosis, Elevated troponin, and significantly elevated BNP.   Pt not able to provide any history,    OVERNIGHT EVENTS:  Comfortable, responsive.    Vital Signs Last 24 Hrs  T(C): 36.2 (14 Feb 2020 06:01), Max: 36.4 (13 Feb 2020 18:16)  T(F): 97.1 (14 Feb 2020 06:01), Max: 97.5 (13 Feb 2020 18:16)  HR: 70 (14 Feb 2020 06:01) (70 - 71)  BP: 116/64 (14 Feb 2020 06:01) (116/64 - 123/66)  BP(mean): --  RR: 17 (14 Feb 2020 06:01) (17 - 17)  SpO2: 97% (14 Feb 2020 06:01) (97% - 98%)    PHYSICAL EXAM:  GEN:         Awake, responsive and comfortable.  HEENT:    Normal.    RESP:      no wheezing.  CVS:           Regular rate and rhythm.   ABD:         Soft, non-tender, non-distended;     MEDICATIONS  (STANDING):  amoxicillin  500 milliGRAM(s)/clavulanate 1 Tablet(s) Oral every 12 hours  aspirin  chewable 81 milliGRAM(s) Oral daily  atorvastatin 10 milliGRAM(s) Oral at bedtime  digoxin     Tablet 0.25 milliGRAM(s) Oral daily  diltiazem    milliGRAM(s) Oral daily  donepezil 10 milliGRAM(s) Oral at bedtime  enoxaparin Injectable 40 milliGRAM(s) SubCutaneous daily  furosemide   Injectable 40 milliGRAM(s) IV Push two times a day  memantine 10 milliGRAM(s) Oral daily  metoprolol tartrate 12.5 milliGRAM(s) Oral two times a day    02-08 @ 03:30  pH: 7.57  pCO2: 32  pO2: 82  SaO2: 97  02-07 @ 22:02  pH: 7.55  pCO2: 32  pO2: 85  SaO2: 97    ASSESSMENT AND PLAN:  ·	Acute hypoxic Respiratory failure.  ·	Acute pulmonary edema.  ·	CHF Systolic/Diastolic.  ·	Bilateral pleural effusion.  ·	A Fib S/P PPM  ·	elevated troponin.  ·	Leukocytosis.  ·	Renal Insuffiencey.  ·	Advanced Dementia    Pulmonary improved and stable.  Spo2 95% on room air.  May dc antibiotics as completed one week.  On Lasix.

## 2020-02-15 ENCOUNTER — TRANSCRIPTION ENCOUNTER (OUTPATIENT)
Age: 85
End: 2020-02-15

## 2020-02-20 DIAGNOSIS — I50.43 ACUTE ON CHRONIC COMBINED SYSTOLIC (CONGESTIVE) AND DIASTOLIC (CONGESTIVE) HEART FAILURE: ICD-10-CM

## 2020-02-20 DIAGNOSIS — Z90.12 ACQUIRED ABSENCE OF LEFT BREAST AND NIPPLE: ICD-10-CM

## 2020-02-20 DIAGNOSIS — F41.9 ANXIETY DISORDER, UNSPECIFIED: ICD-10-CM

## 2020-02-20 DIAGNOSIS — I35.0 NONRHEUMATIC AORTIC (VALVE) STENOSIS: ICD-10-CM

## 2020-02-20 DIAGNOSIS — E43 UNSPECIFIED SEVERE PROTEIN-CALORIE MALNUTRITION: ICD-10-CM

## 2020-02-20 DIAGNOSIS — F03.90 UNSPECIFIED DEMENTIA WITHOUT BEHAVIORAL DISTURBANCE: ICD-10-CM

## 2020-02-20 DIAGNOSIS — I21.4 NON-ST ELEVATION (NSTEMI) MYOCARDIAL INFARCTION: ICD-10-CM

## 2020-02-20 DIAGNOSIS — Z95.0 PRESENCE OF CARDIAC PACEMAKER: ICD-10-CM

## 2020-02-20 DIAGNOSIS — Z51.5 ENCOUNTER FOR PALLIATIVE CARE: ICD-10-CM

## 2020-02-20 DIAGNOSIS — Z66 DO NOT RESUSCITATE: ICD-10-CM

## 2020-02-20 DIAGNOSIS — J18.9 PNEUMONIA, UNSPECIFIED ORGANISM: ICD-10-CM

## 2020-02-20 DIAGNOSIS — E78.5 HYPERLIPIDEMIA, UNSPECIFIED: ICD-10-CM

## 2020-02-20 DIAGNOSIS — I48.91 UNSPECIFIED ATRIAL FIBRILLATION: ICD-10-CM

## 2020-02-20 DIAGNOSIS — N28.9 DISORDER OF KIDNEY AND URETER, UNSPECIFIED: ICD-10-CM

## 2020-02-20 DIAGNOSIS — R06.02 SHORTNESS OF BREATH: ICD-10-CM

## 2020-02-20 DIAGNOSIS — J96.01 ACUTE RESPIRATORY FAILURE WITH HYPOXIA: ICD-10-CM

## 2020-02-20 DIAGNOSIS — Z96.651 PRESENCE OF RIGHT ARTIFICIAL KNEE JOINT: ICD-10-CM

## 2020-02-20 DIAGNOSIS — M10.9 GOUT, UNSPECIFIED: ICD-10-CM

## 2020-02-20 DIAGNOSIS — I11.0 HYPERTENSIVE HEART DISEASE WITH HEART FAILURE: ICD-10-CM

## 2020-02-20 DIAGNOSIS — G30.9 ALZHEIMER'S DISEASE, UNSPECIFIED: ICD-10-CM

## 2020-02-20 DIAGNOSIS — Z85.3 PERSONAL HISTORY OF MALIGNANT NEOPLASM OF BREAST: ICD-10-CM

## 2020-02-24 DIAGNOSIS — J96.01 ACUTE RESPIRATORY FAILURE WITH HYPOXIA: ICD-10-CM

## 2020-02-25 ENCOUNTER — APPOINTMENT (OUTPATIENT)
Dept: CARE COORDINATION | Facility: HOME HEALTH | Age: 85
End: 2020-02-25

## 2020-02-26 ENCOUNTER — APPOINTMENT (OUTPATIENT)
Dept: CARE COORDINATION | Facility: HOME HEALTH | Age: 85
End: 2020-02-26
Payer: MEDICARE

## 2020-02-26 ENCOUNTER — APPOINTMENT (OUTPATIENT)
Dept: CARE COORDINATION | Facility: HOME HEALTH | Age: 85
End: 2020-02-26

## 2020-02-26 VITALS
RESPIRATION RATE: 18 BRPM | OXYGEN SATURATION: 98 % | TEMPERATURE: 98.7 F | DIASTOLIC BLOOD PRESSURE: 60 MMHG | HEART RATE: 72 BPM | SYSTOLIC BLOOD PRESSURE: 148 MMHG

## 2020-02-26 DIAGNOSIS — Z51.5 ENCOUNTER FOR PALLIATIVE CARE: ICD-10-CM

## 2020-02-26 DIAGNOSIS — I50.84 END STAGE HEART FAILURE: ICD-10-CM

## 2020-02-26 PROCEDURE — 99497 ADVNCD CARE PLAN 30 MIN: CPT

## 2020-02-27 PROBLEM — Z51.5 HOSPICE CARE: Status: ACTIVE | Noted: 2020-02-27

## 2020-02-27 PROBLEM — I50.84 END STAGE CONGESTIVE HEART FAILURE: Status: ACTIVE | Noted: 2020-02-27

## 2020-03-18 ENCOUNTER — APPOINTMENT (OUTPATIENT)
Dept: CARDIOLOGY | Facility: CLINIC | Age: 85
End: 2020-03-18

## 2020-06-01 NOTE — HISTORY OF PRESENT ILLNESS
[Family Member] : family member [de-identified] : 88 YO F with Advance dementia (nonverbal), CHF, HTN, AFib s/p PPM, COPD. Patient resting comfortable in chair. Recent hospitalization/readmission at [facility]. Son reports patient with decreased appetite. Patient was seen by [facility] today- services started. Unable to obtain history from patient. No report for any acute distress. \par  [FreeTextEntry1] : decrease appetite - TCM home visit

## 2020-06-01 NOTE — PLAN
[FreeTextEntry1] : Son to call [facility] with any questions/concerns. CN contact information provided for any question/concerns.

## 2020-06-01 NOTE — PHYSICAL EXAM
[No Acute Distress] : no acute distress [No Respiratory Distress] : no respiratory distress  [No Accessory Muscle Use] : no accessory muscle use [Decreased Breath Sounds] : breath sounds were decreased diffusely [Normal Rate] : normal rate  [Regular Rhythm] : with a regular rhythm [Normal S1, S2] : normal S1 and S2 [Pedal Pulses Present] : the pedal pulses are present [No Edema] : there was no peripheral edema [No Extremity Clubbing/Cyanosis] : no extremity clubbing/cyanosis [Non Tender] : non-tender [Soft] : abdomen soft [No Rash] : no rash [Normal Bowel Sounds] : normal bowel sounds [de-identified] : nonverbal

## 2020-07-23 ENCOUNTER — APPOINTMENT (OUTPATIENT)
Dept: CARDIOLOGY | Facility: CLINIC | Age: 85
End: 2020-07-23

## 2021-03-24 NOTE — ED PROVIDER NOTE - DR. NAME
Patient refusing to check blood sugars and does not want to be called again for readings.    Dr. Nakul ESPINOZA   Adele Venegas

## 2022-09-27 NOTE — DISCHARGE NOTE NURSING/CASE MANAGEMENT/SOCIAL WORK - NSDCPEFALRISK_GEN_ALL_CORE
PAST SURGICAL HISTORY:  H/O: hysterectomy     Previous back surgery     S/P appendectomy     
Patient information on fall and injury prevention

## 2023-01-24 NOTE — PATIENT PROFILE ADULT - NSTOBACCONEVERSMOKERY/N_GEN_A
Medication:   Requested Prescriptions     Pending Prescriptions Disp Refills    cetirizine (ZYRTEC) 10 MG tablet [Pharmacy Med Name: CETIRIZINE HCL 10 MG TABLET] 30 tablet 0     Sig: TAKE 1 TABLET BY MOUTH EVERY DAY        Last Filled:      Patient Phone Number: [phone] (home)     Last appt: 12/7/2022   Next appt: 3/10/2023    Last OARRS: No flowsheet data found. No

## 2023-03-17 NOTE — ED ADULT NURSE NOTE - TOBACCO USE
PT called stating she has an appointment tomorrow that she does not know if she will be able to come to and it depends on the humidity and if she can breathe. She really does not know until the day of.  PSR advised PT to please call ASAP when she knows - as early as possible - and that toavoid a late cancel, it must be >2 hours notice.     Unknown if ever smoked Family

## 2023-11-06 NOTE — PATIENT PROFILE ADULT - STATED REASON FOR ADMISSION
METHOTREXATE:            Last OV: 6/13/2023  Has the patient been seen in the last 6-12 months? YES  Copy Dr. Silver's last office visit note and paste below:  PLAN:  1. Continue current medications at current dosages and intervals  2. Continue to obtain CBC and CMP every 3 months.  3. Return to clinic in 6 months.      Next appointment:  12/14/2023  Does the patient have  scheduled follow up appointment? YES    Last CBC with diff AND CMP:  Lab Results   Component Value Date    POTASSIUM 4.0 09/18/2023    SODIUM 139 09/18/2023    ALKPT 67 09/18/2023    GPT 32 09/18/2023    AST 27 09/18/2023    CREATININE 1.07 09/18/2023    BUNCR 24 09/18/2023    GLUCOSE 97 09/18/2023       CBC:    WBC (K/mcL)   Date Value   10/16/2023 4.3     HCT (%)   Date Value   10/16/2023 36.0 (L)     HGB (g/dL)   Date Value   10/16/2023 12.6 (L)     PLT (K/mcL)   Date Value   10/16/2023 153     CMP:  FASTING STATUS (no units)   Date Value   06/18/2012 FASTING     Glucose (mg/dL)   Date Value   09/18/2023 97     Sodium (mmol/L)   Date Value   09/18/2023 139     Potassium (mmol/L)   Date Value   09/18/2023 4.0     Chloride (mmol/L)   Date Value   09/18/2023 102     BUN (mg/dL)   Date Value   09/18/2023 26 (H)     Creatinine (mg/dL)   Date Value   09/18/2023 1.07     Protein, Total (g/dL)   Date Value   09/18/2023 7.4     Albumin (g/dL)   Date Value   09/18/2023 4.2     Calcium (mg/dL)   Date Value   09/18/2023 9.0     Bilirubin, Total (mg/dL)   Date Value   09/18/2023 0.5     GOT/AST (Units/L)   Date Value   09/18/2023 27     Alkaline Phosphatase (Units/L)   Date Value   09/18/2023 67     GPT/ALT (Units/L)   Date Value   09/18/2023 32          Has the patient been on METHOTREXATE for greater than 4 months:   YES: CBC with diff and CMP are due every 3 months    Is the patient up to date with labs YES      DISPOSITION: 1 MONTH SUPPLY SENT: Pt due for labs.  Message sent to patient   SOB

## 2024-01-16 NOTE — REASON FOR VISIT
[Follow-Up - Clinic] : a clinic follow-up of [Atrial Fibrillation] : atrial fibrillation [Medication Management] : Medication management labs and diagnostic imaging results reviewed with patient